# Patient Record
Sex: MALE | Race: BLACK OR AFRICAN AMERICAN | Employment: OTHER | URBAN - METROPOLITAN AREA
[De-identification: names, ages, dates, MRNs, and addresses within clinical notes are randomized per-mention and may not be internally consistent; named-entity substitution may affect disease eponyms.]

---

## 2019-03-17 ENCOUNTER — HOSPITAL ENCOUNTER (EMERGENCY)
Facility: HOSPITAL | Age: 34
Discharge: HOME | End: 2019-03-17
Attending: EMERGENCY MEDICINE
Payer: MEDICARE

## 2019-03-17 VITALS
HEIGHT: 73 IN | RESPIRATION RATE: 19 BRPM | BODY MASS INDEX: 28.36 KG/M2 | SYSTOLIC BLOOD PRESSURE: 135 MMHG | HEART RATE: 93 BPM | WEIGHT: 214 LBS | DIASTOLIC BLOOD PRESSURE: 88 MMHG | OXYGEN SATURATION: 96 % | TEMPERATURE: 98 F

## 2019-03-17 DIAGNOSIS — R56.9 SEIZURE (HCC): Primary | ICD-10-CM

## 2019-03-17 LAB
ANION GAP SERPL CALC-SCNC: 11 MMOL/L (ref 0–18)
BASOPHILS # BLD AUTO: 0.02 X10(3) UL (ref 0–0.2)
BASOPHILS NFR BLD AUTO: 0.3 %
BUN BLD-MCNC: 13 MG/DL (ref 7–18)
BUN/CREAT SERPL: 9.8 (ref 10–20)
CALCIUM BLD-MCNC: 8.7 MG/DL (ref 8.5–10.1)
CHLORIDE SERPL-SCNC: 106 MMOL/L (ref 98–107)
CO2 SERPL-SCNC: 23 MMOL/L (ref 21–32)
CREAT BLD-MCNC: 1.33 MG/DL (ref 0.7–1.3)
DEPRECATED RDW RBC AUTO: 44 FL (ref 35.1–46.3)
EOSINOPHIL # BLD AUTO: 0.06 X10(3) UL (ref 0–0.7)
EOSINOPHIL NFR BLD AUTO: 0.9 %
ERYTHROCYTE [DISTWIDTH] IN BLOOD BY AUTOMATED COUNT: 13.3 % (ref 11–15)
GLUCOSE BLD-MCNC: 108 MG/DL (ref 70–99)
GLUCOSE BLDC GLUCOMTR-MCNC: 108 MG/DL (ref 70–99)
HCT VFR BLD AUTO: 49.1 % (ref 39–53)
HGB BLD-MCNC: 16.3 G/DL (ref 13–17.5)
IMM GRANULOCYTES # BLD AUTO: 0.05 X10(3) UL (ref 0–1)
IMM GRANULOCYTES NFR BLD: 0.8 %
LYMPHOCYTES # BLD AUTO: 0.99 X10(3) UL (ref 1–4)
LYMPHOCYTES NFR BLD AUTO: 15.1 %
MCH RBC QN AUTO: 29.8 PG (ref 26–34)
MCHC RBC AUTO-ENTMCNC: 33.2 G/DL (ref 31–37)
MCV RBC AUTO: 89.8 FL (ref 80–100)
MONOCYTES # BLD AUTO: 1.12 X10(3) UL (ref 0.1–1)
MONOCYTES NFR BLD AUTO: 17.1 %
NEUTROPHILS # BLD AUTO: 4.3 X10 (3) UL (ref 1.5–7.7)
NEUTROPHILS # BLD AUTO: 4.3 X10(3) UL (ref 1.5–7.7)
NEUTROPHILS NFR BLD AUTO: 65.8 %
OSMOLALITY SERPL CALC.SUM OF ELEC: 291 MOSM/KG (ref 275–295)
PLATELET # BLD AUTO: 230 10(3)UL (ref 150–450)
POTASSIUM SERPL-SCNC: 3.7 MMOL/L (ref 3.5–5.1)
RBC # BLD AUTO: 5.47 X10(6)UL (ref 4.3–5.7)
SODIUM SERPL-SCNC: 140 MMOL/L (ref 136–145)
WBC # BLD AUTO: 6.5 X10(3) UL (ref 4–11)

## 2019-03-17 PROCEDURE — 85025 COMPLETE CBC W/AUTO DIFF WBC: CPT

## 2019-03-17 PROCEDURE — 36415 COLL VENOUS BLD VENIPUNCTURE: CPT

## 2019-03-17 PROCEDURE — 82962 GLUCOSE BLOOD TEST: CPT

## 2019-03-17 PROCEDURE — 80177 DRUG SCRN QUAN LEVETIRACETAM: CPT

## 2019-03-17 PROCEDURE — 99284 EMERGENCY DEPT VISIT MOD MDM: CPT

## 2019-03-17 PROCEDURE — 93010 ELECTROCARDIOGRAM REPORT: CPT | Performed by: EMERGENCY MEDICINE

## 2019-03-17 PROCEDURE — 80177 DRUG SCRN QUAN LEVETIRACETAM: CPT | Performed by: EMERGENCY MEDICINE

## 2019-03-17 PROCEDURE — 85025 COMPLETE CBC W/AUTO DIFF WBC: CPT | Performed by: EMERGENCY MEDICINE

## 2019-03-17 PROCEDURE — 80048 BASIC METABOLIC PNL TOTAL CA: CPT

## 2019-03-17 PROCEDURE — 93005 ELECTROCARDIOGRAM TRACING: CPT

## 2019-03-17 PROCEDURE — 80048 BASIC METABOLIC PNL TOTAL CA: CPT | Performed by: EMERGENCY MEDICINE

## 2019-03-17 RX ORDER — TRAMADOL HYDROCHLORIDE 50 MG/1
50 TABLET ORAL EVERY 6 HOURS PRN
COMMUNITY

## 2019-03-17 RX ORDER — LEVETIRACETAM 500 MG/1
1000 TABLET ORAL ONCE
Status: COMPLETED | OUTPATIENT
Start: 2019-03-17 | End: 2019-03-17

## 2019-03-17 NOTE — ED NOTES
Patient given discharge instructions and all questions answered. Patient is dressed and ambulatory with steady gait to exit. Pt mother here to drive pt home.

## 2019-03-17 NOTE — ED INITIAL ASSESSMENT (HPI)
Patient had a witnessed seizure while lying on the couch. EMS states patient had snoring respirations upon arrival but woke with sternal rub. Patient is alert and oriented at this time with no c/o. Patient last seizure 1 year ago.  Takes keppra, last dose F

## 2019-03-18 NOTE — ED PROVIDER NOTES
Patient Seen in: [facility] AND [facility] Emergency Department    History   Patient presents with:  Seizure Disorder (neurologic)    Stated Complaint: seizure    HPI    35 yo male with h/o seizure disorder on keppra presents after a seizure.  He fell asleep on t guarding. Musculoskeletal: Normal range of motion. He exhibits no edema or tenderness. Lymphadenopathy:     He has no cervical adenopathy. Neurological: He is alert and oriented to person, place, and time. No cranial nerve deficit or sensory deficit. Impression:  Seizure [facility])  (primary encounter diagnosis)    Disposition:  Discharge  3/17/2019  5:45 am    Follow-up:  Mikey Fuentes MD  [address] [phone]    In 1 week      We recommend that you s

## 2019-03-18 NOTE — CM/SW NOTE
Printed resources on how to report seizures to [facility] in [place], including form for patient and MD to fill out as requested from [address] [place] [address]. Resources given to patient with his discharge instructions per [facility].

## 2019-03-19 LAB — LEVETIRACETAM (KEPPRA): <2 UG/ML

## 2025-01-18 ENCOUNTER — HOSPITAL ENCOUNTER (INPATIENT)
Facility: HOSPITAL | Age: 40
LOS: 1 days | Discharge: ANOTHER HOSPITAL | DRG: 299 | End: 2025-01-19
Attending: EMERGENCY MEDICINE | Admitting: INTERNAL MEDICINE
Payer: MEDICAID

## 2025-01-18 ENCOUNTER — HOSPITAL ENCOUNTER (INPATIENT)
Facility: HOSPITAL | Age: 40
LOS: 1 days | Discharge: STILL ADMITTED OR ADMINISTRATIVE | End: 2025-01-19
Attending: EMERGENCY MEDICINE | Admitting: INTERNAL MEDICINE
Payer: MEDICAID

## 2025-01-18 ENCOUNTER — ANESTHESIA EVENT (OUTPATIENT)
Dept: INTERVENTIONAL RADIOLOGY/VASCULAR | Facility: HOSPITAL | Age: 40
End: 2025-01-18
Payer: MEDICAID

## 2025-01-18 ENCOUNTER — APPOINTMENT (OUTPATIENT)
Dept: ULTRASOUND IMAGING | Facility: HOSPITAL | Age: 40
DRG: 299 | End: 2025-01-18
Attending: EMERGENCY MEDICINE
Payer: MEDICAID

## 2025-01-18 ENCOUNTER — APPOINTMENT (OUTPATIENT)
Dept: CT IMAGING | Facility: HOSPITAL | Age: 40
DRG: 299 | End: 2025-01-18
Attending: EMERGENCY MEDICINE
Payer: MEDICAID

## 2025-01-18 ENCOUNTER — APPOINTMENT (OUTPATIENT)
Dept: INTERVENTIONAL RADIOLOGY/VASCULAR | Facility: HOSPITAL | Age: 40
DRG: 299 | End: 2025-01-18
Attending: SURGERY
Payer: MEDICAID

## 2025-01-18 ENCOUNTER — APPOINTMENT (OUTPATIENT)
Dept: ULTRASOUND IMAGING | Facility: HOSPITAL | Age: 40
End: 2025-01-18
Attending: EMERGENCY MEDICINE
Payer: MEDICAID

## 2025-01-18 ENCOUNTER — APPOINTMENT (OUTPATIENT)
Dept: GENERAL RADIOLOGY | Facility: HOSPITAL | Age: 40
DRG: 299 | End: 2025-01-18
Attending: EMERGENCY MEDICINE
Payer: MEDICAID

## 2025-01-18 ENCOUNTER — APPOINTMENT (OUTPATIENT)
Dept: CT IMAGING | Facility: HOSPITAL | Age: 40
End: 2025-01-18
Attending: EMERGENCY MEDICINE
Payer: MEDICAID

## 2025-01-18 ENCOUNTER — APPOINTMENT (OUTPATIENT)
Dept: INTERVENTIONAL RADIOLOGY/VASCULAR | Facility: HOSPITAL | Age: 40
End: 2025-01-18
Attending: SURGERY
Payer: MEDICAID

## 2025-01-18 ENCOUNTER — APPOINTMENT (OUTPATIENT)
Dept: GENERAL RADIOLOGY | Facility: HOSPITAL | Age: 40
End: 2025-01-18
Attending: EMERGENCY MEDICINE
Payer: MEDICAID

## 2025-01-18 ENCOUNTER — HOSPITAL ENCOUNTER (INPATIENT)
Facility: HOSPITAL | Age: 40
LOS: 1 days | Discharge: ANOTHER HOSPITAL | End: 2025-01-19
Attending: EMERGENCY MEDICINE | Admitting: INTERNAL MEDICINE
Payer: MEDICAID

## 2025-01-18 DIAGNOSIS — I71.00 DISSECTION OF AORTA, UNSPECIFIED PORTION OF AORTA (HCC): Primary | ICD-10-CM

## 2025-01-18 DIAGNOSIS — I10 HYPERTENSION, UNSPECIFIED TYPE: ICD-10-CM

## 2025-01-18 LAB
ALBUMIN SERPL-MCNC: 3.9 G/DL (ref 3.2–4.8)
ALBUMIN/GLOB SERPL: 1.3 {RATIO} (ref 1–2)
ALP LIVER SERPL-CCNC: 73 U/L
ALT SERPL-CCNC: 7 U/L
ANION GAP SERPL CALC-SCNC: 11 MMOL/L (ref 0–18)
ANTIBODY SCREEN: POSITIVE
APTT PPP: 30 SECONDS (ref 23–36)
AST SERPL-CCNC: 10 U/L (ref ?–34)
BASOPHILS # BLD AUTO: 0.02 X10(3) UL (ref 0–0.2)
BASOPHILS NFR BLD AUTO: 0.2 %
BILIRUB SERPL-MCNC: 0.3 MG/DL (ref 0.3–1.2)
BUN BLD-MCNC: 48 MG/DL (ref 9–23)
BUN/CREAT SERPL: 4.6 (ref 10–20)
CALCIUM BLD-MCNC: 8.6 MG/DL (ref 8.7–10.4)
CHLORIDE SERPL-SCNC: 104 MMOL/L (ref 98–112)
CK SERPL-CCNC: 142 U/L
CO2 SERPL-SCNC: 29 MMOL/L (ref 21–32)
CREAT BLD-MCNC: 10.37 MG/DL
DEPRECATED RDW RBC AUTO: 51.9 FL (ref 35.1–46.3)
DIRECT COOMBS POLY: NEGATIVE
EGFRCR SERPLBLD CKD-EPI 2021: 6 ML/MIN/1.73M2 (ref 60–?)
EOSINOPHIL # BLD AUTO: 0.15 X10(3) UL (ref 0–0.7)
EOSINOPHIL NFR BLD AUTO: 1.6 %
ERYTHROCYTE [DISTWIDTH] IN BLOOD BY AUTOMATED COUNT: 15.8 % (ref 11–15)
GLOBULIN PLAS-MCNC: 3.1 G/DL (ref 2–3.5)
GLUCOSE BLD-MCNC: 102 MG/DL (ref 70–99)
GLUCOSE BLDC GLUCOMTR-MCNC: 102 MG/DL (ref 70–99)
HCT VFR BLD AUTO: 27.4 %
HGB BLD-MCNC: 9 G/DL
IMM GRANULOCYTES # BLD AUTO: 0.04 X10(3) UL (ref 0–1)
IMM GRANULOCYTES NFR BLD: 0.4 %
LYMPHOCYTES # BLD AUTO: 0.75 X10(3) UL (ref 1–4)
LYMPHOCYTES NFR BLD AUTO: 7.9 %
MCH RBC QN AUTO: 30 PG (ref 26–34)
MCHC RBC AUTO-ENTMCNC: 32.8 G/DL (ref 31–37)
MCV RBC AUTO: 91.3 FL
MONOCYTES # BLD AUTO: 1.82 X10(3) UL (ref 0.1–1)
MONOCYTES NFR BLD AUTO: 19.1 %
NEUTROPHILS # BLD AUTO: 6.74 X10 (3) UL (ref 1.5–7.7)
NEUTROPHILS # BLD AUTO: 6.74 X10(3) UL (ref 1.5–7.7)
NEUTROPHILS NFR BLD AUTO: 70.8 %
OSMOLALITY SERPL CALC.SUM OF ELEC: 311 MOSM/KG (ref 275–295)
PLATELET # BLD AUTO: 196 10(3)UL (ref 150–450)
POTASSIUM SERPL-SCNC: 5.1 MMOL/L (ref 3.5–5.1)
PROT SERPL-MCNC: 7 G/DL (ref 5.7–8.2)
RBC # BLD AUTO: 3 X10(6)UL
RH BLOOD TYPE: POSITIVE
RH BLOOD TYPE: POSITIVE
SARS-COV-2 RNA RESP QL NAA+PROBE: NOT DETECTED
SODIUM SERPL-SCNC: 144 MMOL/L (ref 136–145)
TROPONIN I SERPL HS-MCNC: 50 NG/L
WBC # BLD AUTO: 9.5 X10(3) UL (ref 4–11)

## 2025-01-18 PROCEDURE — 93005 ELECTROCARDIOGRAM TRACING: CPT

## 2025-01-18 PROCEDURE — 86900 BLOOD TYPING SEROLOGIC ABO: CPT | Performed by: EMERGENCY MEDICINE

## 2025-01-18 PROCEDURE — 36200 PLACE CATHETER IN AORTA: CPT | Performed by: SURGERY

## 2025-01-18 PROCEDURE — 99291 CRITICAL CARE FIRST HOUR: CPT

## 2025-01-18 PROCEDURE — 37221 HC TRANSLUMINAL ANGIOPLASTY W STENT ILIAC UNILAT INIT: CPT | Performed by: SURGERY

## 2025-01-18 PROCEDURE — 86870 RBC ANTIBODY IDENTIFICATION: CPT | Performed by: EMERGENCY MEDICINE

## 2025-01-18 PROCEDURE — 75635 CT ANGIO ABDOMINAL ARTERIES: CPT | Performed by: EMERGENCY MEDICINE

## 2025-01-18 PROCEDURE — 96368 THER/DIAG CONCURRENT INF: CPT

## 2025-01-18 PROCEDURE — 75957: CPT | Performed by: SURGERY

## 2025-01-18 PROCEDURE — 96365 THER/PROPH/DIAG IV INF INIT: CPT

## 2025-01-18 PROCEDURE — 33881 EVASC RPR TA NDGFT XCOV LSA: CPT | Performed by: SURGERY

## 2025-01-18 PROCEDURE — 93010 ELECTROCARDIOGRAM REPORT: CPT

## 2025-01-18 PROCEDURE — 37253 INTRVASC US NONCORONARY ADDL: CPT | Performed by: SURGERY

## 2025-01-18 PROCEDURE — 36140 INTRO NDL ICATH UPR/LXTR ART: CPT | Performed by: SURGERY

## 2025-01-18 PROCEDURE — 86850 RBC ANTIBODY SCREEN: CPT | Performed by: EMERGENCY MEDICINE

## 2025-01-18 PROCEDURE — 02VW3EZ RESTRICTION OF THORACIC AORTA, DESCENDING WITH BRANCHED OR FENESTRATED INTRALUMINAL DEVICE, ONE OR TWO ARTERIES, PERCUTANEOUS APPROACH: ICD-10-PCS | Performed by: SURGERY

## 2025-01-18 PROCEDURE — 82550 ASSAY OF CK (CPK): CPT | Performed by: EMERGENCY MEDICINE

## 2025-01-18 PROCEDURE — 80053 COMPREHEN METABOLIC PANEL: CPT | Performed by: EMERGENCY MEDICINE

## 2025-01-18 PROCEDURE — 86922 COMPATIBILITY TEST ANTIGLOB: CPT

## 2025-01-18 PROCEDURE — 37223 HC TRANSLUMINAL ANGIOPLASTY W STENT ILIAC EA ADDL: CPT | Performed by: SURGERY

## 2025-01-18 PROCEDURE — 84484 ASSAY OF TROPONIN QUANT: CPT | Performed by: EMERGENCY MEDICINE

## 2025-01-18 PROCEDURE — 37236 OPEN/PERQ PLACE STENT 1ST: CPT | Performed by: SURGERY

## 2025-01-18 PROCEDURE — 71275 CT ANGIOGRAPHY CHEST: CPT | Performed by: EMERGENCY MEDICINE

## 2025-01-18 PROCEDURE — 85025 COMPLETE CBC W/AUTO DIFF WBC: CPT | Performed by: EMERGENCY MEDICINE

## 2025-01-18 PROCEDURE — 86077 PHYS BLOOD BANK SERV XMATCH: CPT | Performed by: EMERGENCY MEDICINE

## 2025-01-18 PROCEDURE — 04VJ3DZ RESTRICTION OF LEFT EXTERNAL ILIAC ARTERY WITH INTRALUMINAL DEVICE, PERCUTANEOUS APPROACH: ICD-10-PCS | Performed by: SURGERY

## 2025-01-18 PROCEDURE — 85730 THROMBOPLASTIN TIME PARTIAL: CPT | Performed by: EMERGENCY MEDICINE

## 2025-01-18 PROCEDURE — 86901 BLOOD TYPING SEROLOGIC RH(D): CPT | Performed by: EMERGENCY MEDICINE

## 2025-01-18 PROCEDURE — 85347 COAGULATION TIME ACTIVATED: CPT

## 2025-01-18 PROCEDURE — 86880 COOMBS TEST DIRECT: CPT | Performed by: EMERGENCY MEDICINE

## 2025-01-18 PROCEDURE — 34709 PLMT XTN PROSTH EVASC RPR: CPT | Performed by: SURGERY

## 2025-01-18 PROCEDURE — 34713 PERQ ACCESS & CLSR FEM ART: CPT | Performed by: SURGERY

## 2025-01-18 PROCEDURE — 82962 GLUCOSE BLOOD TEST: CPT

## 2025-01-18 PROCEDURE — 96375 TX/PRO/DX INJ NEW DRUG ADDON: CPT

## 2025-01-18 PROCEDURE — 37252 INTRVASC US NONCORONARY 1ST: CPT | Performed by: SURGERY

## 2025-01-18 PROCEDURE — 04VH3DZ RESTRICTION OF RIGHT EXTERNAL ILIAC ARTERY WITH INTRALUMINAL DEVICE, PERCUTANEOUS APPROACH: ICD-10-PCS | Performed by: SURGERY

## 2025-01-18 PROCEDURE — 34707 EVASC RPR ILIO-ILIAC NDGFT: CPT | Performed by: SURGERY

## 2025-01-18 RX ORDER — HEPARIN SODIUM AND DEXTROSE 10000; 5 [USP'U]/100ML; G/100ML
1000 INJECTION INTRAVENOUS ONCE
Status: COMPLETED | OUTPATIENT
Start: 2025-01-18 | End: 2025-01-19

## 2025-01-18 RX ORDER — PROTAMINE SULFATE 10 MG/ML
INJECTION, SOLUTION INTRAVENOUS AS NEEDED
Status: DISCONTINUED | OUTPATIENT
Start: 2025-01-18 | End: 2025-01-19 | Stop reason: SURG

## 2025-01-18 RX ORDER — HEPARIN SODIUM 1000 [USP'U]/ML
INJECTION, SOLUTION INTRAVENOUS; SUBCUTANEOUS
Status: COMPLETED
Start: 2025-01-18 | End: 2025-01-18

## 2025-01-18 RX ORDER — IOPAMIDOL 612 MG/ML
400 INJECTION, SOLUTION INTRAVASCULAR
Status: COMPLETED | OUTPATIENT
Start: 2025-01-18 | End: 2025-01-18

## 2025-01-18 RX ORDER — LIDOCAINE HYDROCHLORIDE 20 MG/ML
INJECTION, SOLUTION INFILTRATION; PERINEURAL
Status: COMPLETED
Start: 2025-01-18 | End: 2025-01-18

## 2025-01-18 RX ORDER — ONDANSETRON 2 MG/ML
4 INJECTION INTRAMUSCULAR; INTRAVENOUS ONCE
Status: COMPLETED | OUTPATIENT
Start: 2025-01-18 | End: 2025-01-18

## 2025-01-18 RX ORDER — LIDOCAINE HYDROCHLORIDE 10 MG/ML
INJECTION, SOLUTION INFILTRATION; PERINEURAL
Status: COMPLETED | OUTPATIENT
Start: 2025-01-18 | End: 2025-01-18

## 2025-01-18 RX ORDER — SODIUM CHLORIDE, SODIUM LACTATE, POTASSIUM CHLORIDE, CALCIUM CHLORIDE 600; 310; 30; 20 MG/100ML; MG/100ML; MG/100ML; MG/100ML
INJECTION, SOLUTION INTRAVENOUS CONTINUOUS PRN
Status: DISCONTINUED | OUTPATIENT
Start: 2025-01-18 | End: 2025-01-19 | Stop reason: SURG

## 2025-01-18 RX ORDER — FUROSEMIDE 10 MG/ML
80 INJECTION INTRAMUSCULAR; INTRAVENOUS ONCE
Status: COMPLETED | OUTPATIENT
Start: 2025-01-18 | End: 2025-01-18

## 2025-01-18 RX ORDER — ROCURONIUM BROMIDE 10 MG/ML
INJECTION, SOLUTION INTRAVENOUS AS NEEDED
Status: DISCONTINUED | OUTPATIENT
Start: 2025-01-18 | End: 2025-01-19 | Stop reason: SURG

## 2025-01-18 RX ORDER — ONDANSETRON 2 MG/ML
INJECTION INTRAMUSCULAR; INTRAVENOUS AS NEEDED
Status: DISCONTINUED | OUTPATIENT
Start: 2025-01-18 | End: 2025-01-19 | Stop reason: SURG

## 2025-01-18 RX ORDER — MORPHINE SULFATE 4 MG/ML
4 INJECTION, SOLUTION INTRAMUSCULAR; INTRAVENOUS ONCE
Status: COMPLETED | OUTPATIENT
Start: 2025-01-18 | End: 2025-01-18

## 2025-01-18 RX ORDER — LIDOCAINE HYDROCHLORIDE 10 MG/ML
INJECTION, SOLUTION EPIDURAL; INFILTRATION; INTRACAUDAL; PERINEURAL AS NEEDED
Status: DISCONTINUED | OUTPATIENT
Start: 2025-01-18 | End: 2025-01-19 | Stop reason: SURG

## 2025-01-18 RX ORDER — HEPARIN SODIUM 1000 [USP'U]/ML
INJECTION, SOLUTION INTRAVENOUS; SUBCUTANEOUS AS NEEDED
Status: DISCONTINUED | OUTPATIENT
Start: 2025-01-18 | End: 2025-01-19 | Stop reason: SURG

## 2025-01-18 RX ORDER — PROTAMINE SULFATE 10 MG/ML
INJECTION, SOLUTION INTRAVENOUS
Status: COMPLETED
Start: 2025-01-18 | End: 2025-01-18

## 2025-01-18 RX ORDER — HYDRALAZINE HYDROCHLORIDE 20 MG/ML
15 INJECTION INTRAMUSCULAR; INTRAVENOUS ONCE
Status: COMPLETED | OUTPATIENT
Start: 2025-01-18 | End: 2025-01-18

## 2025-01-18 RX ORDER — HEPARIN SODIUM AND DEXTROSE 10000; 5 [USP'U]/100ML; G/100ML
INJECTION INTRAVENOUS CONTINUOUS
Status: DISCONTINUED | OUTPATIENT
Start: 2025-01-19 | End: 2025-01-19

## 2025-01-18 RX ADMIN — HEPARIN SODIUM 4000 UNITS: 1000 INJECTION, SOLUTION INTRAVENOUS; SUBCUTANEOUS at 22:15:00

## 2025-01-18 RX ADMIN — HEPARIN SODIUM 1000 UNITS: 1000 INJECTION, SOLUTION INTRAVENOUS; SUBCUTANEOUS at 22:52:00

## 2025-01-18 RX ADMIN — ROCURONIUM BROMIDE 20 MG: 10 INJECTION, SOLUTION INTRAVENOUS at 22:11:00

## 2025-01-18 RX ADMIN — PROTAMINE SULFATE 20 MG: 10 INJECTION, SOLUTION INTRAVENOUS at 23:59:00

## 2025-01-18 RX ADMIN — HEPARIN SODIUM 1000 UNITS: 1000 INJECTION, SOLUTION INTRAVENOUS; SUBCUTANEOUS at 22:40:00

## 2025-01-18 RX ADMIN — HEPARIN SODIUM 4000 UNITS: 1000 INJECTION, SOLUTION INTRAVENOUS; SUBCUTANEOUS at 22:05:00

## 2025-01-18 RX ADMIN — HEPARIN SODIUM 1000 UNITS: 1000 INJECTION, SOLUTION INTRAVENOUS; SUBCUTANEOUS at 22:27:00

## 2025-01-18 RX ADMIN — ROCURONIUM BROMIDE 100 MG: 10 INJECTION, SOLUTION INTRAVENOUS at 21:15:00

## 2025-01-18 RX ADMIN — SODIUM CHLORIDE, SODIUM LACTATE, POTASSIUM CHLORIDE, CALCIUM CHLORIDE: 600; 310; 30; 20 INJECTION, SOLUTION INTRAVENOUS at 21:25:00

## 2025-01-18 RX ADMIN — ROCURONIUM BROMIDE 10 MG: 10 INJECTION, SOLUTION INTRAVENOUS at 23:39:00

## 2025-01-18 RX ADMIN — ONDANSETRON 4 MG: 2 INJECTION INTRAMUSCULAR; INTRAVENOUS at 21:58:00

## 2025-01-18 RX ADMIN — LIDOCAINE HYDROCHLORIDE 2 ML: 10 INJECTION, SOLUTION INFILTRATION; PERINEURAL at 21:18:00

## 2025-01-18 RX ADMIN — HEPARIN SODIUM AND DEXTROSE 1000 UNITS/HR: 10000; 5 INJECTION INTRAVENOUS at 21:09:00

## 2025-01-18 RX ADMIN — LIDOCAINE HYDROCHLORIDE 50 MG: 10 INJECTION, SOLUTION EPIDURAL; INFILTRATION; INTRACAUDAL; PERINEURAL at 21:15:00

## 2025-01-18 RX ADMIN — PROTAMINE SULFATE 10 MG: 10 INJECTION, SOLUTION INTRAVENOUS at 23:57:00

## 2025-01-18 RX ADMIN — ROCURONIUM BROMIDE 10 MG: 10 INJECTION, SOLUTION INTRAVENOUS at 23:11:00

## 2025-01-18 RX ADMIN — ROCURONIUM BROMIDE 30 MG: 10 INJECTION, SOLUTION INTRAVENOUS at 22:28:00

## 2025-01-18 RX ADMIN — SODIUM CHLORIDE, SODIUM LACTATE, POTASSIUM CHLORIDE, CALCIUM CHLORIDE: 600; 310; 30; 20 INJECTION, SOLUTION INTRAVENOUS at 21:15:00

## 2025-01-18 NOTE — ED PROVIDER NOTES
Patient Seen in: [facility]         EMERGENCY DEPARTMENT NOTE    Dictated. Voice Transcription software has been utilized for this dictation (the reader should be aware that typographical errors are possible with voice transcription software and to please contact the dictating physician if there are questions.)         History     Chief Complaint   Patient presents with    Leg Pain       There may be discrepancies from triage note.     HPI    History provided by patient who provides an extremely poor history.  39-year-old male who reports a history of end-stage renal disease who receives dialysis Monday, Wednesday, Friday and last received dialysis yesterday complaining of left leg pain for 3 days.  Reports mild perispinal low back pain.  No trauma.  No associated abdominal pain.  Denies skin color changes.  No associated swelling.  States that he has had pain in the past however has never been evaluated.  He cannot quantify how long he has been having this pain.  Patient intermittently verbally aggressive with myself and staff  \" I have already told you my medicines.\"      No fevers, chills, nausea, vomiting, diarrhea, constipation, cough, cold symptoms, urinary complaints.  No chest pain, shortness of breath  No headache, neck pain, neck stiffness, incontinence.  No changes in mentation, no changes in vision, no total/new extremity weakness, no total/new extremity paresthesia, no difficulty speaking.  No alleviating or exacerbating factors.  Denies orthopnea, pnd, change in exercise tolerance limited by chest pain/sob , lower extremity edema/asymmetry.   No personal history of ACS, PE, DVT.  Denies anticoagulation/antiplatelet use          History reviewed.   Past Medical History:    Essential hypertension    Seizure disorder (HCC)    Stroke (HCC)       History reviewed.   Past Surgical History:   Procedure Laterality Date    Transplantation of kidney  2017         Medications :  Prescriptions Prior to  Admission[1]     No family history on file.    Smoking Status:   Social History     Socioeconomic History    Marital status:    Tobacco Use    Smoking status: Never    Smokeless tobacco: Never       Review of Systems   Constitutional: Negative.    HENT: Negative.     Eyes: Negative.    Respiratory: Negative.     Cardiovascular: Negative.    Gastrointestinal: Negative.  Negative for abdominal pain.   Genitourinary: Negative.    Musculoskeletal: Negative.    Skin: Negative.    Neurological: Negative.    Endo/Heme/Allergies: Negative.    Psychiatric/Behavioral: Negative.     All other systems reviewed and are negative.    Pertinent positives as listed.  All other organ systems are reviewed and are negative.    Constitutional and vital signs reviewed.      Social History and Family History elements reviewed from today, pertinent positives to the presenting problem noted.    Physical Exam     ED Triage Vitals [01/18/25 1658]   BP (!) 196/113   Pulse 83   Resp 20   Temp 99 °F (37.2 °C)   Temp src Temporal   SpO2 96 %   O2 Device None (Room air)       All measures to prevent infection transmission during my interaction with the patient were taken. The patient was already wearing a droplet mask on my arrival to the room. Personal protective equipment including droplet mask, eye protection, and gloves were worn throughout the duration of the exam.  Handwashing was performed prior to and after the exam.  Stethoscope and any equipment used during my examination was cleaned with super sani-cloth germicidal wipes following the exam.     Physical Exam  Vitals and nursing note reviewed.   Constitutional:       General: He is not in acute distress.     Appearance: He is not ill-appearing or toxic-appearing.   Cardiovascular:      Rate and Rhythm: Normal rate and regular rhythm.      Comments: Dorsalis pedis pulses are not palpable B however pt has B warm feet. B dp pulse dopplerable, but thready.   Pulmonary:      Effort:  Pulmonary effort is normal. No respiratory distress.      Breath sounds: No stridor. No wheezing, rhonchi or rales.   Chest:      Chest wall: No tenderness.   Abdominal:      General: There is no distension.      Palpations: Abdomen is soft. There is no mass.      Tenderness: There is no abdominal tenderness. There is no right CVA tenderness, left CVA tenderness, guarding or rebound.      Comments: Negative Longoria sign, negative McBurney's point tenderness     Musculoskeletal:      Cervical back: Normal range of motion and neck supple.      Right lower leg: No edema.      Left lower leg: No edema.      Comments: No spinal tenderness diffusely.  No step-offs.  Normal range of motion of back.  No hpi td      Skin:     Capillary Refill: Capillary refill takes less than 2 seconds.      Coloration: Skin is not jaundiced or pale.      Findings: No bruising, erythema or rash.   Neurological:      Mental Status: He is alert.      Comments: 5/5 bilateral leg extension/flexion  5/5 bilateral knee extension  5/5 B foot dorsiflexion/plantarflexion    Sensory function intact symmetrically and bilaterally to lower extremities.       Psychiatric:         Mood and Affect: Mood normal.         Behavior: Behavior normal.           Review of prior notes in Care everywhere/Epic performed by myself:  Patient was seen in the ER 1/6/2025 for uncontrolled hypertension upon chart review  It appears patient saw neurology November 2024.  Seizures after CVA noted per this note.  Stroke in 2019 per this note.  Per this note it appeared patient had a left MCA stroke in 2019 with residual aphasia.  Failed renal transplant in 2017.          ED Course     If labs obtained, they are personally reviewed by myself:     Labs Reviewed   COMP METABOLIC PANEL (14) - Abnormal; Notable for the following components:       Result Value    Glucose 102 (*)     BUN 48 (*)     Creatinine 10.37 (*)     BUN/CREA Ratio 4.6 (*)     Calcium, Total 8.6 (*)      Calculated Osmolality 311 (*)     eGFR-Cr 6 (*)     ALT 7 (*)     All other components within normal limits   CBC WITH DIFFERENTIAL WITH PLATELET - Abnormal; Notable for the following components:    RBC 3.00 (*)     HGB 9.0 (*)     HCT 27.4 (*)     RDW-SD 51.9 (*)     RDW 15.8 (*)     Lymphocyte Absolute 0.75 (*)     Monocyte Absolute 1.82 (*)     All other components within normal limits   POCT GLUCOSE - Abnormal; Notable for the following components:    POC Glucose  102 (*)     All other components within normal limits   CK CREATINE KINASE (NOT CREATININE) - Normal   PTT, ACTIVATED - Normal   TROPONIN I HIGH SENSITIVITY - Normal   RAPID SARS-COV-2 BY PCR - Normal   TYPE AND SCREEN    Narrative:     The following orders were created for panel order Type and screen.                  Procedure                               Abnormality         Status                                     ---------                               -----------         ------                                     ABORH (Blood Type)[[id number]]                               Final result                               Antibody Screen[[id number]]                                  Final result                                                 Please view results for these tests on the individual orders.   ABORH (BLOOD TYPE)   ANTIBODY SCREEN   ABORH CONFIRMATION   PREPARE RBC   DIRECT ANTIGLOBULIN TEST (EDGARDO)   PREPARE RBC   RAINBOW DRAW LAVENDER   RAINBOW DRAW LIGHT GREEN   RAINBOW DRAW BLUE   RAINBOW DRAW GOLD       If radiologic studies ordered during today's ER visit, my independent interpretation are seen directly below.  This is awaiting the radiologist's final interpretation.  CTA chest, independent interpretation of radiologic study completed by myself and awaiting formal radiologist interpretation: + dissection  CTA abd/pelvis: + dissection           Imaging Results read by radiology in ED: CTA CHEST (CPT=71275)    Result Date:  1/18/2025  CONCLUSION:  Markedly suboptimal artifactually degraded examination. Within these parameters: 1. Descending aortic dissection (likely Farmington type B, without clear evidence of Sarbjit type A involvement of the ascending thoracic aorta).  2. Aortic thrombus is suggested in the false lumen in the descending thoracic aorta.  3. Suspected severe bilateral pulmonary interstitial edema, with or without concurrent infectious process.  4. Bilateral pleural effusions.  5. No evidence of acute central pulmonary embolism involving the main pulmonary artery branches, but without diagnostic assessment of lobar, segmental, or subsegmental levels.  6. Dilatation of the main pulmonary artery trunk may relate to underlying pulmonary hypertension.   7. Ascending thoracic aortic ectasia.  8. Mediastinal lymphadenopathy, perhaps reactive.  9. Lesser incidental findings as above.    Results of this examination were discussed with the patient's physician, Dr. Tyler, by Dr. Culp at 1938 (as study was in progress on the CT scanner) on 01/18/2025.   Dictated by (CST): Enzo Culp MD on 1/18/2025 at 7:48 PM     Finalized by (CST): Enzo Culp MD on 1/18/2025 at 7:57 PM          CTA ABDOMEN/PELVIS LOWER EXT BILAT W RUNOFF (CZC=[id number])    Result Date: 1/18/2025  CONCLUSION:  1. Aortic dissection throughout the visualized descending thoracic aorta and abdominal aorta; the proximal extent of the dissection is not known (whether this represents a Farmington type A or Farmington type B dissection). Completion CT angiography of the chest is recommended.  2. Suggestion of extensive pulmonary interstitial edema. This could be acute; correlation for cardiac insufficiency is recommended.  3. The aortic dissection propagates into the celiac axis; the SMA appears to arise from the true lumen.   4. Dissection flap appears to extend into the left renal artery ostium, and left renal arterial flow predominately derived from the false  lumen.  5. Dissection flap extending into the right common iliac artery and right internal iliac artery origin; there is occlusive thrombus involving the distal right common femoral artery over 3.9 cm length with distal reconstitution. Distally, there is patency  of the popliteal artery with three-vessel runoff.  6. Extensive dissection extending into the left common iliac artery with occlusive thrombus of the distal common femoral artery involving a 3.5 cm length segment. There is suspected propagation of the dissection into the left superficial femoral and popliteal arteries, with relatively diminished flow in the left lower extremity. There is thready flow in the runoff vessels.  7. Flow is visualized in the left dorsalis pedis, which appears to have variant anatomy driving from the left peroneal artery.  8. There are bilateral pleural effusions and associated atelectasis, with or without superimposed pneumonia.  9. Suspected rejected right lower quadrant renal allograft.   10. Advanced atrophy of the native kidneys.  11. Low-density appearance of the intracardiac blood pool raises the possibility of underlying anemia. Correlate with hematologic parameters.  12. Heavy stool burden with possible fecal impaction in the rectal vault.  13. Lesser incidental findings as above.     Results of this examination were discussed with the patient's physician, Dr. Pelletier, by Dr. Culp at 1911 on 01/18/2025.   Dictated by (CST): Enzo Culp MD on 1/18/2025 at 7:11 PM     Finalized by (CST): Enzo Culp MD on 1/18/2025 at 7:32 PM               ED Medications Administered:   Medications   esmolol (Brevibloc) 2000 mg/100mL infusion premix ( Intravenous Handoff 1/18/25 2104)   heparin (Porcine) 70709 units/250mL infusion ED INITIAL DOSE (1,000 Units/hr Intravenous Handoff 1/18/25 2104)   heparin (Porcine) 22661 units/250mL infusion ACS/AFIB CONTINUOUS (1,000 Units/hr Intravenous New Bag 1/18/25 2109)   clevidipine  (Cleviprex) 25 MG/50ML IV infusion (has no administration in time range)   norepinephrine (Levophed) 4 mg/250mL infusion premix (has no administration in time range)   niCARdipine in sodium chloride 0.86% (carDENE) 20 mg/200mL infusion premix ( Intravenous Handoff 1/18/25 2104)   morphINE PF 4 MG/ML injection 4 mg (4 mg Intravenous Given 1/18/25 1734)   ondansetron (Zofran) 4 MG/2ML injection 4 mg (4 mg Intravenous Given 1/18/25 1741)   hydrALAzine (Apresoline) 20 mg/mL injection 15 mg (15 mg Intravenous Given 1/18/25 1907)   iopamidol 76% (ISOVUE-370) injection for power injector (80 mL Intravenous Given 1/18/25 1855)   fentaNYL (Sublimaze) 50 mcg/mL injection 50 mcg (50 mcg Intravenous Given 1/18/25 1915)   iopamidol 76% (ISOVUE-370) injection for power injector (80 mL Intravenous Given 1/18/25 1940)   furosemide (Lasix) 10 mg/mL injection 80 mg (80 mg Intravenous Given 1/18/25 1948)   nitroGLYCERIN (Nitrobid) 2 % ointment 1 inch (1 inch Topical Given 1/18/25 1947)   heparin (Porcine) 1000 UNIT/ML injection (has no administration in time range)   heparin in sodium chloride 0.9% (Porcine) 2 Units/mL flush bag premix (has no administration in time range)   lidocaine (Xylocaine) 2 % injection (has no administration in time range)   ceFAZolin (Ancef) 2 g/10mL IV syringe premix (has no administration in time range)   fentaNYL (Sublimaze) 50 mcg/mL injection (has no administration in time range)           Vitals:    01/18/25 2045 01/18/25 2050 01/18/25 2056 01/18/25 2101   BP: (!) 158/100 (!) 150/92 140/88 142/88   Pulse:  88 87 87   Resp:  24 20 20   Temp:       TempSrc:       SpO2:  94% 92%    Weight:       Height:         *I personally reviewed and interpreted all ED vitals.    Pulse Ox interpretation by myself: 99%, Room air, Normal     Monitor Interpretation by myself:   normal sinus rhythm    If Ekg obtained during today's visit, it is independently interpreted by myself directly below:  EKG    Rate, intervals and  axes as noted on EKG Report.  Rate: 82  Rhythm: Sinus Rhythm  Reading: no st elevation, no st depression LVH, T wave noted in 1-3 in lateral leads               Medical Record Review: I personally reviewed available prior medical records for any recent pertinent discharge summaries, testing, and procedures and reviewed those reports.      [facility]     Medical decision making/ED Course:   39-year-old male who presents to the emergency room for left lower leg pain.  Extremely difficult history to obtain.  He also reported some mild paraspinal low back pain, no abdominal pain.  Dorsalis pedis pulses could not be palpable however they were dopplerable on exam.  Warm feet bilaterally.  Moving all extremities however he provided limited exam.  His symptoms seem most consistent with aortic dissection.  I personally spoke to reading radiologist twice discussing his CT scans.  CTA abdomen/pelvis with runoffs initially obtained with almost complete descending aortic dissection noted.  Patient sent back to scanner to receive CTA of chest and was found to have type B dissection.  He arrived to the emergency department hypertensive and initially hydralazine was given without improvement of his blood pressure.  Case discussed with vascular surgeon who requested esmolol, heparin, blood on hold.  CT of patient's chest revealed pulmonary edema, likely secondary to his known dissection.  Case rediscussed with vascular surgery who did not believe nitroglycerin would be helpful at this time.  Recommended emergent repair.  He swiftly rounded his team and evaluated patient here in the emergency department.  I personally spoke to  as vascular surgery stated he would prefer patient to be transferred to [facility] after repair here at [facility].  She informs me there is only 1 ICU bed at [facility].  Labs revealing stable electrolytes.  Patient's creatinine is elevated however he is known history of end-stage renal  disease.  No leukocytosis, hemoglobin of 9.  Platelet count normal.  CK level normal.  Troponin negative.  APTT normal.  Upon numerous reevaluations, patient remained hypertensive however blood pressures mildly improved with esmolol and nicardipine.    Patient was taken to the operating room by our staff.  I personally spoke to him and his ex-wife regarding his critical status.      ACS, PE, among other life-threatening medical conditions considered and seems unlikely given patient's history, exam, and appearance.  Pt agrees and is aware of plan.             Differential Diagnosis:  as listed above in medical decision making.   *Please note that in the presenting to the emergency department, illness/injury that poses a threat to life or function is considered during this patient's initial evaluation.    The complexity of this visit is therefore inherently more complex given the need to consider life threatening pathology prior to any other etiology for this patient's visit.    The differential diagnosis and medical decision above exemplify this rationale.       Medical Decision Making  Problems Addressed:  Dissection of aorta, unspecified portion of aorta (HCC): acute illness or injury that poses a threat to life or bodily functions  Hypertension, unspecified type: chronic illness or injury with exacerbation, progression, or side effects of treatment    Amount and/or Complexity of Data Reviewed  External Data Reviewed: notes.  Labs: ordered. Decision-making details documented in ED Course.  Radiology: ordered and independent interpretation performed. Decision-making details documented in ED Course.  ECG/medicine tests: ordered and independent interpretation performed. Decision-making details documented in ED Course.  Discussion of management or test interpretation with external provider(s): CT technician  Vascular surgeon  Cardiothoracic surgeon  Radiologist    Risk  Parenteral controlled substances.  Decision  regarding hospitalization.  Elective major surgery with identified risk factors.    Critical Care  Total time providing critical care: 79 minutes          ED Course as of 01/19/25 0042  ------------------------------------------------------------  Time: 01/18 1700  Comment: Patient's blood pressure is elevated 180/103, pulse of 82,  ------------------------------------------------------------  Time: 01/18 1745  Comment: Patient's blood pressure remains elevated 179/101  ------------------------------------------------------------  Time: 01/18 1800  Comment: Patient blood pressure remains elevated 165/101  ------------------------------------------------------------  Time: 01/18 1908  Comment: Personally spoke to CT technician, concern for possible dissection.  Images not viewable at this time as they are reconstituting.  I requested radiologist to read in an expedited fashion  ------------------------------------------------------------  Time: 01/18 1916  Comment: Dr. Sifuentes reviewing images.  Request esmolol with parameters as mentioned in order, 4 units on hold , he request heparin drip at this time  ------------------------------------------------------------  Time: 01/18 1947  Comment: Case discussed with radiologist informs that there is an aortic arch thrombus and type B dissection.  No ascending aortic dissection.  Case discussed with vascular cardiology thoracic surgeon who informs me type B dissection will be managed by vascular surgery, Dr. Sifuentes.   ------------------------------------------------------------  Time: 01/18 2000  Comment: Respoke to vascular surgeon over telephone.  It appears patient's blood has antibodies, vascular surgeon aware.  He  requests heparin, this was ordered.  He is and route to the hospital for surgery.  I personally spoke to patient, patient's ex-wife via telephone who will notify patient's mother.  Patient and patient's ex-wife are aware of patient's critical condition.     Patient remains in no significant distress, saturating 95% on 8 L of high flow.   ------------------------------------------------------------  Time: 01/18 2011  Comment: Third IV established  ------------------------------------------------------------  Time: 01/18 2021  Comment: Vascular surgeon at bedside.  Patient saturating 91% on 8 L of high flow.  Blood pressures remain high.       Vitals:    01/18/25 2045 01/18/25 2050 01/18/25 2056 01/18/25 2101   BP: (!) 158/100 (!) 150/92 140/88 142/88   Pulse:  88 87 87   Resp:  24 20 20   Temp:       TempSrc:       SpO2:  94% 92%    Weight:       Height:                 Complicating Factors: Significant medical problems that contribute to the complexity of this emergency room evaluation is listed above.    Condition upon leaving the department: Stable    Disposition and Plan     Clinical Impression:  1. Dissection of aorta, unspecified portion of aorta (HCC)    2. Hypertension, unspecified type        Disposition:  Send to or/cath/gi    Medications Prescribed:  Current Discharge Medication List                     [1]   Medications Prior to Admission   Medication Sig Dispense Refill Last Dose/Taking    NON FORMULARY 2 (two) times daily. Nifedipine unknown dose       NON FORMULARY keppra unknown dose       traMADol HCl 50 MG Oral Tab Take 50 mg by mouth every 6 (six) hours as needed for Pain.       NON FORMULARY Anti rejection med for kidney transplant

## 2025-01-18 NOTE — ED INITIAL ASSESSMENT (HPI)
Pt arrivs via EMS c/o left leg pain radiating from thigh down and tingling starting ~ 90 mins PTA. From home. BP w/ EMS 200s/130s . HD MWF. 3L removed yesterday. Santos -    Pt at [facility] 2 weeks ago for seizure. Pt fell at home today d/t leg pain. Denies LOC

## 2025-01-19 ENCOUNTER — HOSPITAL ENCOUNTER (INPATIENT)
Facility: HOSPITAL | Age: 40
LOS: 6 days | Discharge: HOME | End: 2025-01-25
Attending: SURGERY | Admitting: SURGERY
Payer: MEDICAID

## 2025-01-19 ENCOUNTER — APPOINTMENT (OUTPATIENT)
Dept: CT IMAGING | Facility: HOSPITAL | Age: 40
End: 2025-01-19
Attending: SURGERY
Payer: MEDICAID

## 2025-01-19 VITALS
HEIGHT: 72 IN | TEMPERATURE: 97 F | WEIGHT: 201.94 LBS | RESPIRATION RATE: 18 BRPM | DIASTOLIC BLOOD PRESSURE: 85 MMHG | BODY MASS INDEX: 27.35 KG/M2 | SYSTOLIC BLOOD PRESSURE: 152 MMHG | OXYGEN SATURATION: 95 % | HEART RATE: 93 BPM

## 2025-01-19 DIAGNOSIS — I71.21 ANEURYSM OF ASCENDING AORTA WITHOUT RUPTURE: Primary | ICD-10-CM

## 2025-01-19 PROBLEM — I10 PRIMARY HYPERTENSION: Status: ACTIVE | Noted: 2025-01-18

## 2025-01-19 PROBLEM — N18.6 ESRD (END STAGE RENAL DISEASE) (HCC): Status: ACTIVE | Noted: 2025-01-19

## 2025-01-19 PROBLEM — I71.03 DISSECTION OF AORTA, THORACOABDOMINAL (HCC): Status: ACTIVE | Noted: 2025-01-19

## 2025-01-19 PROBLEM — I71.012 DESCENDING THORACIC AORTIC DISSECTION (HCC): Status: ACTIVE | Noted: 2025-01-19

## 2025-01-19 PROBLEM — Z99.2 ANEMIA DUE TO CHRONIC KIDNEY DISEASE, ON CHRONIC DIALYSIS (HCC): Status: ACTIVE | Noted: 2025-01-19

## 2025-01-19 PROBLEM — N18.6 ANEMIA DUE TO CHRONIC KIDNEY DISEASE, ON CHRONIC DIALYSIS (HCC): Status: ACTIVE | Noted: 2025-01-19

## 2025-01-19 PROBLEM — D63.1 ANEMIA DUE TO CHRONIC KIDNEY DISEASE, ON CHRONIC DIALYSIS (HCC): Status: ACTIVE | Noted: 2025-01-19

## 2025-01-19 LAB
ALBUMIN SERPL-MCNC: 3.9 G/DL (ref 3.2–4.8)
ALBUMIN/GLOB SERPL: 1.3 {RATIO} (ref 1–2)
ALP LIVER SERPL-CCNC: 68 U/L
ALT SERPL-CCNC: <7 U/L
ANION GAP SERPL CALC-SCNC: 11 MMOL/L (ref 0–18)
AST SERPL-CCNC: 11 U/L (ref ?–34)
ATRIAL RATE: 82 BPM
BILIRUB SERPL-MCNC: 0.2 MG/DL (ref 0.3–1.2)
BLOOD TYPE BARCODE: 9500
BUN BLD-MCNC: 51 MG/DL (ref 9–23)
CALCIUM BLD-MCNC: 8.2 MG/DL (ref 8.7–10.6)
CHLORIDE SERPL-SCNC: 100 MMOL/L (ref 98–112)
CHOLEST SERPL-MCNC: 164 MG/DL (ref ?–200)
CO2 SERPL-SCNC: 26 MMOL/L (ref 21–32)
CREAT BLD-MCNC: 10.96 MG/DL
EGFRCR SERPLBLD CKD-EPI 2021: 6 ML/MIN/1.73M2 (ref 60–?)
ERYTHROCYTE [DISTWIDTH] IN BLOOD BY AUTOMATED COUNT: 15.9 %
GLOBULIN PLAS-MCNC: 2.9 G/DL (ref 2–3.5)
GLUCOSE BLD-MCNC: 110 MG/DL (ref 70–99)
GLUCOSE BLDC GLUCOMTR-MCNC: 104 MG/DL (ref 70–99)
HBV SURFACE AB SER QL: NONREACTIVE
HBV SURFACE AB SERPL IA-ACNC: <3.1 MIU/ML
HBV SURFACE AG SER-ACNC: 0.38 [IU]/L
HBV SURFACE AG SERPL QL IA: NONREACTIVE
HCT VFR BLD AUTO: 26.5 %
HDLC SERPL-MCNC: 40 MG/DL (ref 40–59)
HGB BLD-MCNC: 8.6 G/DL
INR BLD: 1.28 (ref 0.8–1.2)
IRON SATN MFR SERPL: 24 %
IRON SERPL-MCNC: 40 UG/DL
LDLC SERPL CALC-MCNC: 108 MG/DL (ref ?–100)
MAGNESIUM SERPL-MCNC: 2.2 MG/DL (ref 1.6–2.6)
MCH RBC QN AUTO: 30.3 PG (ref 26–34)
MCHC RBC AUTO-ENTMCNC: 32.5 G/DL (ref 31–37)
MCV RBC AUTO: 93.3 FL
MRSA DNA SPEC QL NAA+PROBE: NEGATIVE
NONHDLC SERPL-MCNC: 124 MG/DL (ref ?–130)
OSMOLALITY SERPL CALC.SUM OF ELEC: 298 MOSM/KG (ref 275–295)
P AXIS: 67 DEGREES
P-R INTERVAL: 210 MS
PHOSPHATE SERPL-MCNC: 7 MG/DL (ref 2.4–5.1)
PLATELET # BLD AUTO: 172 10(3)UL (ref 150–450)
POTASSIUM SERPL-SCNC: 5.8 MMOL/L (ref 3.5–5.1)
PROT SERPL-MCNC: 6.8 G/DL (ref 5.7–8.2)
PROTHROMBIN TIME: 16.1 SECONDS (ref 11.6–14.8)
Q-T INTERVAL: 418 MS
QRS DURATION: 82 MS
QTC CALCULATION (BEZET): 488 MS
R AXIS: 7 DEGREES
RBC # BLD AUTO: 2.84 X10(6)UL
SODIUM SERPL-SCNC: 137 MMOL/L (ref 136–145)
T AXIS: -87 DEGREES
TOTAL IRON BINDING CAPACITY: 168 UG/DL (ref 250–425)
TRANSFERRIN SERPL-MCNC: 119 MG/DL (ref 215–365)
TRIGL SERPL-MCNC: 84 MG/DL (ref 30–149)
UNIT VOLUME: 350 ML
VENTRICULAR RATE: 82 BPM
VLDLC SERPL CALC-MCNC: 14 MG/DL (ref 0–30)
WBC # BLD AUTO: 11.1 X10(3) UL (ref 4–11)

## 2025-01-19 PROCEDURE — 71275 CT ANGIOGRAPHY CHEST: CPT | Performed by: SURGERY

## 2025-01-19 PROCEDURE — 99223 1ST HOSP IP/OBS HIGH 75: CPT | Performed by: INTERNAL MEDICINE

## 2025-01-19 PROCEDURE — 99291 CRITICAL CARE FIRST HOUR: CPT | Performed by: INTERNAL MEDICINE

## 2025-01-19 PROCEDURE — 5A1D70Z PERFORMANCE OF URINARY FILTRATION, INTERMITTENT, LESS THAN 6 HOURS PER DAY: ICD-10-PCS | Performed by: INTERNAL MEDICINE

## 2025-01-19 PROCEDURE — 82962 GLUCOSE BLOOD TEST: CPT

## 2025-01-19 PROCEDURE — 74174 CTA ABD&PLVS W/CONTRAST: CPT | Performed by: SURGERY

## 2025-01-19 PROCEDURE — 99233 SBSQ HOSP IP/OBS HIGH 50: CPT | Performed by: EMERGENCY MEDICINE

## 2025-01-19 RX ORDER — HYDROMORPHONE HYDROCHLORIDE 1 MG/ML
0.4 INJECTION, SOLUTION INTRAMUSCULAR; INTRAVENOUS; SUBCUTANEOUS EVERY 2 HOUR PRN
Status: DISCONTINUED | OUTPATIENT
Start: 2025-01-19 | End: 2025-01-23

## 2025-01-19 RX ORDER — HYDROMORPHONE HYDROCHLORIDE 1 MG/ML
0.8 INJECTION, SOLUTION INTRAMUSCULAR; INTRAVENOUS; SUBCUTANEOUS EVERY 2 HOUR PRN
Status: DISCONTINUED | OUTPATIENT
Start: 2025-01-19 | End: 2025-01-23

## 2025-01-19 RX ORDER — HYDROMORPHONE HYDROCHLORIDE 1 MG/ML
0.4 INJECTION, SOLUTION INTRAMUSCULAR; INTRAVENOUS; SUBCUTANEOUS EVERY 2 HOUR PRN
Status: DISCONTINUED | OUTPATIENT
Start: 2025-01-19 | End: 2025-01-19

## 2025-01-19 RX ORDER — SODIUM CHLORIDE 9 MG/ML
INJECTION, SOLUTION INTRAVENOUS CONTINUOUS
Status: DISCONTINUED | OUTPATIENT
Start: 2025-01-19 | End: 2025-01-19

## 2025-01-19 RX ORDER — ACETAMINOPHEN 500 MG
500 TABLET ORAL EVERY 4 HOURS PRN
Status: DISCONTINUED | OUTPATIENT
Start: 2025-01-19 | End: 2025-01-20

## 2025-01-19 RX ORDER — ONDANSETRON 2 MG/ML
4 INJECTION INTRAMUSCULAR; INTRAVENOUS EVERY 6 HOURS PRN
Status: DISCONTINUED | OUTPATIENT
Start: 2025-01-19 | End: 2025-01-19

## 2025-01-19 RX ORDER — SENNOSIDES 8.6 MG
17.2 TABLET ORAL NIGHTLY PRN
Status: DISCONTINUED | OUTPATIENT
Start: 2025-01-19 | End: 2025-01-25

## 2025-01-19 RX ORDER — HYDRALAZINE HYDROCHLORIDE 20 MG/ML
10 INJECTION INTRAMUSCULAR; INTRAVENOUS EVERY 4 HOURS PRN
Status: DISCONTINUED | OUTPATIENT
Start: 2025-01-19 | End: 2025-01-25

## 2025-01-19 RX ORDER — CARVEDILOL 25 MG/1
25 TABLET ORAL 2 TIMES DAILY WITH MEALS
COMMUNITY

## 2025-01-19 RX ORDER — ONDANSETRON 2 MG/ML
4 INJECTION INTRAMUSCULAR; INTRAVENOUS EVERY 6 HOURS PRN
Status: DISCONTINUED | OUTPATIENT
Start: 2025-01-19 | End: 2025-01-25

## 2025-01-19 RX ORDER — POLYETHYLENE GLYCOL 3350 17 G/17G
17 POWDER, FOR SOLUTION ORAL DAILY PRN
Status: DISCONTINUED | OUTPATIENT
Start: 2025-01-19 | End: 2025-01-25

## 2025-01-19 RX ORDER — HYDROMORPHONE HYDROCHLORIDE 1 MG/ML
0.2 INJECTION, SOLUTION INTRAMUSCULAR; INTRAVENOUS; SUBCUTANEOUS EVERY 2 HOUR PRN
Status: DISCONTINUED | OUTPATIENT
Start: 2025-01-19 | End: 2025-01-19

## 2025-01-19 RX ORDER — HEPARIN SODIUM 1000 [USP'U]/ML
1.5 INJECTION, SOLUTION INTRAVENOUS; SUBCUTANEOUS
Status: DISCONTINUED | OUTPATIENT
Start: 2025-01-19 | End: 2025-01-25

## 2025-01-19 RX ORDER — HEPARIN SODIUM 5000 [USP'U]/ML
5000 INJECTION, SOLUTION INTRAVENOUS; SUBCUTANEOUS EVERY 8 HOURS SCHEDULED
Status: DISCONTINUED | OUTPATIENT
Start: 2025-01-19 | End: 2025-01-19

## 2025-01-19 RX ORDER — HYDROCODONE BITARTRATE AND ACETAMINOPHEN 5; 325 MG/1; MG/1
1 TABLET ORAL EVERY 4 HOURS PRN
Status: DISCONTINUED | OUTPATIENT
Start: 2025-01-19 | End: 2025-01-19

## 2025-01-19 RX ORDER — PROCHLORPERAZINE EDISYLATE 5 MG/ML
5 INJECTION INTRAMUSCULAR; INTRAVENOUS EVERY 8 HOURS PRN
Status: DISCONTINUED | OUTPATIENT
Start: 2025-01-19 | End: 2025-01-25

## 2025-01-19 RX ORDER — AMLODIPINE BESYLATE 10 MG/1
10 TABLET ORAL DAILY
Status: DISCONTINUED | OUTPATIENT
Start: 2025-01-19 | End: 2025-01-25

## 2025-01-19 RX ORDER — HYDROMORPHONE HYDROCHLORIDE 1 MG/ML
0.2 INJECTION, SOLUTION INTRAMUSCULAR; INTRAVENOUS; SUBCUTANEOUS EVERY 2 HOUR PRN
Status: DISCONTINUED | OUTPATIENT
Start: 2025-01-19 | End: 2025-01-23

## 2025-01-19 RX ORDER — BISACODYL 10 MG
10 SUPPOSITORY, RECTAL RECTAL
Status: DISCONTINUED | OUTPATIENT
Start: 2025-01-19 | End: 2025-01-25

## 2025-01-19 RX ORDER — HYDRALAZINE HYDROCHLORIDE 100 MG/1
100 TABLET, FILM COATED ORAL 3 TIMES DAILY
COMMUNITY

## 2025-01-19 RX ORDER — ACETAMINOPHEN 325 MG/1
650 TABLET ORAL EVERY 4 HOURS PRN
Status: DISCONTINUED | OUTPATIENT
Start: 2025-01-19 | End: 2025-01-19

## 2025-01-19 RX ORDER — PROCHLORPERAZINE EDISYLATE 5 MG/ML
5 INJECTION INTRAMUSCULAR; INTRAVENOUS EVERY 8 HOURS PRN
Status: DISCONTINUED | OUTPATIENT
Start: 2025-01-19 | End: 2025-01-19

## 2025-01-19 RX ORDER — AMLODIPINE BESYLATE 10 MG/1
10 TABLET ORAL DAILY
COMMUNITY
End: 2025-01-25

## 2025-01-19 RX ORDER — HYDROMORPHONE HYDROCHLORIDE 1 MG/ML
0.8 INJECTION, SOLUTION INTRAMUSCULAR; INTRAVENOUS; SUBCUTANEOUS EVERY 2 HOUR PRN
Status: DISCONTINUED | OUTPATIENT
Start: 2025-01-19 | End: 2025-01-19

## 2025-01-19 RX ORDER — LAMOTRIGINE 150 MG/1
150 TABLET ORAL EVERY 12 HOURS
COMMUNITY

## 2025-01-19 RX ORDER — ACETAMINOPHEN 500 MG
500 TABLET ORAL EVERY 4 HOURS PRN
Status: DISCONTINUED | OUTPATIENT
Start: 2025-01-19 | End: 2025-01-19

## 2025-01-19 RX ORDER — HYDROCODONE BITARTRATE AND ACETAMINOPHEN 5; 325 MG/1; MG/1
2 TABLET ORAL EVERY 4 HOURS PRN
Status: DISCONTINUED | OUTPATIENT
Start: 2025-01-19 | End: 2025-01-19

## 2025-01-19 RX ADMIN — PROTAMINE SULFATE 20 MG: 10 INJECTION, SOLUTION INTRAVENOUS at 00:01:00

## 2025-01-19 NOTE — CONSULTS
[facility] Patient Status:  Inpatient    1985 MRN [id number]   Location [facility] 4SW-A Attending Dakota Gómez MD   Hosp Day # 0 PCP OREN GALAVIZ MD     Date of Admission: 2025  Admission Diagnosis: Aortic dissection (HCC) [I71.00]     History of Present Illness: 40 y/o with h/o HTN, ESRD on HD and SZR d/o who was admitted to [facility] with c/o LLE pain along with lower back pain.  Denies recent MVA/trauma/falls.  Has h/o similar pain in the past but not as severe and did not seek medical w/u.  In ER, pt underwent w/u including CTA C/A/P which revealed a large descending aortic dissection (Sarbjit type B) along with aortic thrombus and pulmonary edema.  Was evaluated by PV surgery and required emergent intervention.  - pt transferred to [place] from [place] for higher level of care.  - no recent N/V/D/dysuria/hemoptysis/f/c.  - Of note, pt was seen in ER 25 for persistent, uncontrolled HTN.  - has a h/o L MCA CVA, failed renal transplant      Past Medical History:    Essential hypertension    Seizure disorder (HCC)    Stroke (HCC)      Past Surgical History:   Procedure Laterality Date    Transplantation of kidney  2017       Allergies[1]NKDA     Social History:   reports that he has never smoked. He has never used smokeless tobacco.      Family History:  Reviewed. Per EMR     Current Medications:    Current Facility-Administered Medications:     niCARdipine in sodium chloride 0.86% (carDENE) 20 mg/200mL infusion premix, , ,      REVIEW OF SYSTEMS:   As listed in HPI, otherwise ten point ROS is negative.     OBJECTIVE:  /84   Pulse 89   Temp 97.8 °F (36.6 °C) (Temporal)   Resp 17   Wt 179 lb 14.3 oz (81.6 kg)   SpO2 94%   BMI 24.40 kg/m²        Wt Readings from Last 3 Encounters:   25 179 lb 14.3 oz (81.6 kg)   25 201 lb 15.1 oz (91.6 kg)   19 214 lb (97.1 kg)        No intake/output data recorded.  No  intake/output data recorded.     General appearance: alert, appears stated age, cooperative, and mild distress  Head: Normocephalic, without obvious abnormality, atraumatic  Neck: no adenopathy, no carotid bruit, and supple, symmetrical, trachea midline  Back: symmetric, no curvature. ROM normal. No CVA tenderness.  Lungs: diminished breath sounds bilaterally  Chest wall: no tenderness, R sided HD tunneled catheter noted  Heart: regular rate and rhythm  Abdomen: soft, non-tender; bowel sounds normal; no masses,  no organomegaly  Extremities: extremities normal, atraumatic, no cyanosis or edema  Skin: Skin color, texture, turgor normal. No rashes or lesions         No results found for: \"PT\", \"INR\"       Imaging: CTA chest personally reviewed:  CONCLUSION:   Markedly suboptimal artifactually degraded examination. Within these parameters:   1. Descending aortic dissection (likely Perry type B, without clear evidence of Perry type A involvement of the ascending thoracic aorta).      2. Aortic thrombus is suggested in the false lumen in the descending thoracic aorta.      3. Suspected severe bilateral pulmonary interstitial edema, with or without concurrent infectious process.      4. Bilateral pleural effusions.      5. No evidence of acute central pulmonary embolism involving the main pulmonary artery branches, but without diagnostic assessment of lobar, segmental, or subsegmental levels.      6. Dilatation of the main pulmonary artery trunk may relate to underlying pulmonary hypertension.       7. Ascending thoracic aortic ectasia.      8. Mediastinal lymphadenopathy, perhaps reactive.      ASSESSMENT/PLAN:  HTN emergency - with associated pulmonary edema and dissection of descending aorta  - tight BP control with goal -180  - cardene gtt prn  - as OP, on amlodipine, coreg, hydralazine tid- will resume slowly as BP allows  Descending aortic dissection (Sarbjit type B)  - s/p endovascular repair, plasty,  stenting of aorta, bilateral iliac arteries (1/19)  - PV surgery managing closely  - BP control, as mentioned above  - analgesic support  ESRD on HD, failed renal transplant 2017  - MWF HD; however, pt will need HD today, given volume load, pulm edema and persistent HTN  - consult renal.  Follows through [facility] typically  Acute hypoxic resp failure - extubated in PACU; requiring O2 via NC  - due to pulm edema  - volume mgt per renal   - NIPPV prn for increased WOB  Neuro- h/o CVA and subsequent seizure d/o  - no signs of active seizure  - resume lamictal  - monitor  F/E/N- minimize IVFs, lytes per protocol, ADAT  Proph- start subcut hep when OK with PV surgery  Dispo - Full code  - pt is critically ill at risk for further deterioration    Upon my evaluation, this patient had a high probability of imminent or life-threatening deterioration due to hypertension emergency, which required my direct attention, intervention, and personal management.    I have personally provided 53 minutes of critical care time, exclusive of time spent on separately billable procedures.  Time includes review of all pertinent laboratory/radiology results, discussion with consultants, and monitoring for potential decompensation.  Performed interventions included vasoactive medications.    Mark Anthony Gallagher MD  1/19/2025  3:20 AM        [1] No Known Allergies

## 2025-01-19 NOTE — CONSULTS
Vascular Surgery  New Patient Consultation    Name: Christian Hernandez  MRN: [id number]  : 1985    Referring Provider: Dakota Gómez    CC: Type B aortic dissection    HPI: 39-year-old male with history of hypertension, incisional disease on dialysis, previous failed renal transplant who presents with severe lower abdominal pain/back pain with leg pain.  He states that this began approximately 2 weeks ago but over the last 2 days it had worsened to the point where today it was severe and prompted his evaluation.  He endorses significant numbness to the left leg with tingling and some subjective weakness.  He is limited severely by the pain as well.  He also has had some shortness of breath that is worsened over the last 2 days.  She does not smoke.    Past Medical History:    Essential hypertension    Seizure disorder (HCC)    Stroke (HCC)       Past Surgical History:   Procedure Laterality Date    Transplantation of kidney  2017       Allergies[1]    Medications Ordered Prior to Encounter[2]    Social History     Socioeconomic History    Marital status:    Tobacco Use    Smoking status: Never    Smokeless tobacco: Never        No family history on file.    ROS:  See HPI above for pertinent positives and negatives.   Constitutional: No fevers, chills, fatigue or night sweats.  ENT: No mouth pain, neck pain, running nose, headaches or swollen glands.  Skin: No rashes, pruritus or skin changes,  Respiratory: Denies cough, wheezing or shortness of breath.  CV: Denies chest pain, palpitations, orthopnea, PND or dizziness.  Musculoskeletal: No joint pain, stiffness or swelling.  GI: No nausea, vomiting or diarrhea. No blood in stools.  Neurologic: No seizures, tremors, weakness or numbness.     Physical Examination  Vitals:    25 0746 25 0747 25 0800 25 0900   BP:  127/75 127/75 154/78   BP Location:       Pulse: 90 89 89 91   Resp: 14 15 13 16   Temp: 98.8 °F (37.1 °C)      TempSrc:  Temporal      SpO2: 93% 92% 93% 91%   Weight:          Skin no rashes or skin lesions identified  Neck supple; no LAD; trachea midline  RRR  CTAB; breathing comfortably  Abd soft, NT, ND;  no palpable abdominal masses.  RUE: Palpable radial and ulnar pulse  LUE: Palpable radial and ulnar pulse  RLE: faint Fem, nonpalp DP, nonpalp PT   LLE: faint  Fem, nonpalp DP, nonpalp PT   Neurologic: CN II-XII grossly intact  5/5 sensorimotor function in the bilateral upper and lower extremities except for strength 4 out of 5 in the left lower extremity.  Significant pain with movement of the leg.  Significant numbness throughout the entirety of the left leg as well.    Labs:  Lab Results   Component Value Date    WBC 11.1 (H) 01/19/2025    HGB 8.6 (L) 01/19/2025    HCT 26.5 (L) 01/19/2025    .0 01/19/2025    NEPERCENT 70.8 01/18/2025    LYPERCENT 7.9 01/18/2025    MOPERCENT 19.1 01/18/2025    EOPERCENT 1.6 01/18/2025    BAPERCENT 0.2 01/18/2025    NE 6.74 01/18/2025    LYMABS 0.75 (L) 01/18/2025    MOABSO 1.82 (H) 01/18/2025    EOABSO 0.15 01/18/2025    BAABSO 0.02 01/18/2025       Imaging: CTA: Imaging reviewed.  See final report for the details.  Evidence of type B aortic dissection with thrombus in the false lumen in the descending thoracic aorta.  Evidence of severe true lumen compression with occlusion of the left external iliac and bilateral common femorals.    Assessment and Plan:  39 year old male with numerous comorbidities who presents with complicated type B dissection with malperfusion.  Given his symptoms of limb ischemia I have recommended emergent thoracic endovascular aortic repair with iliac stenting.  I discussed the risk and benefits of the procedure not limited to the risk of bleeding, infection, stroke, MI, rupture, spinal cord ischemia, limb loss, death.  He affirms his understanding.  His wife also on the phone affirms understanding and agrees.  Consent obtained.  All questions and concerns were  addressed.  Proceed to the OR as above.      Thank you for allowing me to participate in this patient's care.    Dakota Gómez MD  [facility]  Vascular Surgery         [1] No Known Allergies  [2]   No current facility-administered medications on file prior to encounter.     Current Outpatient Medications on File Prior to Encounter   Medication Sig Dispense Refill    NON FORMULARY 2 (two) times daily. Nifedipine unknown dose      NON FORMULARY keppra unknown dose      traMADol HCl 50 MG Oral Tab Take 50 mg by mouth every 6 (six) hours as needed for Pain.      NON FORMULARY Anti rejection med for kidney transplant

## 2025-01-19 NOTE — ANESTHESIA PROCEDURE NOTES
Peripheral IV  Date/Time: 1/18/2025 9:25 PM  Inserted by: Tonny Flor DO    Placement  Needle size: 18 G  Laterality: left  Location: hand  Local anesthetic: none  Site prep: alcohol  Technique: anatomical landmarks

## 2025-01-19 NOTE — PROGRESS NOTES
Vascular surgery progress note    Patient seen and examined.  No significant pain other than some groin discomfort.  Vitals stable and within desired limits.  On examination, groin flat with no hematoma.  Neurologically intact in the lower extremities.  Tolerating p.o.  Will have the patient undergo half session of dialysis today and tomorrow.  Will continue permissive hypertension for spinal cord ischemia protection.  He will remain in the ICU today for neurochecks.  Will continue to monitor his progress.  Plan for CTA today after dialysis to assess full extent of repair.  Will continue to follow closely.  Communicated with the patient and his mother today who is at the bedside.

## 2025-01-19 NOTE — ANESTHESIA PREPROCEDURE EVALUATION
Anesthesia PreOp Note    HPI:     Christian Hernandez is a 39 year old male who presents for preoperative consultation requested by: * Surgery not found *    Date of Surgery:       Indication: * No surgery found *    Relevant Problems   No relevant active problems       NPO:                         History Review:  Patient Active Problem List    Diagnosis Date Noted    Aortic dissection (HCC) 01/18/2025       Past Medical History:    Essential hypertension    Seizure disorder (HCC)    Stroke (HCC)       Past Surgical History:   Procedure Laterality Date    Transplantation of kidney  2017       Prescriptions Prior to Admission[1]  Current Medications and Prescriptions Ordered in Epic[2]    Allergies[3]    No family history on file.  Social History     Socioeconomic History    Marital status:    Tobacco Use    Smoking status: Never    Smokeless tobacco: Never       Available pre-op labs reviewed.  Lab Results   Component Value Date    WBC 9.5 01/18/2025    RBC 3.00 (L) 01/18/2025    HGB 9.0 (L) 01/18/2025    HCT 27.4 (L) 01/18/2025    MCV 91.3 01/18/2025    MCH 30.0 01/18/2025    MCHC 32.8 01/18/2025    RDW 15.8 (H) 01/18/2025    .0 01/18/2025     Lab Results   Component Value Date     01/18/2025    K 5.1 01/18/2025     01/18/2025    CO2 29.0 01/18/2025    BUN 48 (H) 01/18/2025    CREATSERUM 10.37 (H) 01/18/2025     (H) 01/18/2025    PGLU 102 (H) 01/18/2025    CA 8.6 (L) 01/18/2025          Vital Signs:  Body mass index is 27.39 kg/m².   height is 1.829 m (6') and weight is 91.6 kg (201 lb 15.1 oz). His temporal temperature is 99 °F (37.2 °C). His blood pressure is 158/94 (abnormal) and his pulse is 88. His respiration is 26 and oxygen saturation is 91%.   Vitals:    01/18/25 2010 01/18/25 2015 01/18/25 2033 01/18/25 2036   BP: (!) 186/109 (!) 177/107 (!) 159/99 (!) 158/94   Pulse: 88 88 88    Resp: 23 26     Temp:       TempSrc:       SpO2: (!) 89% 91%     Weight:       Height:             Anesthesia Evaluation      Airway   Mallampati: III  TM distance: >3 FB  Neck ROM: full  Dental      Pulmonary - normal exam   Cardiovascular   (+) hypertension    ROS comment: Aortic dissection      Narrative  PROCEDURE: CT ANGIOGRAPHY CHEST (CPT=71275)     COMPARISON: [facility], CTA ABDOMEN/PELVIS LOWER EXT BILAT W RUNOFF (UXH=68382), 1/18/2025, 6:41 PM.     INDICATIONS: Aortic dissection.     TECHNIQUE: Multidetector CT images of the chest were obtained with non-ionic intravenous contrast material. Automated exposure control for dose reduction was used. Adjustment of the mA and/or kV was done based on the patient's size. Iterative  reconstruction technique for dose reduction was employed. Dose information was transmitted to the ACR (American College of Radiology) NRDR (National Radiology Data Registry), which includes the Dose Index Registry. Multiplanar reformats and maximum  intensity projection images were created.       FINDINGS:  COMMENT: There is suboptimal arterial-phase enhancement. Overall examination quality is degraded by beam hardening artifact throughout the imaged volume secondary to the patient's bilateral arm-down positioning. Images are further significantly  compromised by patient motion artifact.  DEVICES: There is a right-sided large-bore dual lumen hemodialysis catheter with tip terminating near the cavoatrial junction.  CARDIAC: The heart is enlarged. There is no bowing of the interventricular septum to suggest right ventricular strain. Atherosclerotic vascular calcifications are present in the coronary vessels.  THORACIC AORTA: Normal-variant configuration with a shared origin of the brachiocephalic trunk and left common carotid artery and with the left vertebral artery arising directly from the arch is seen; a descending thoracic aortic dissection flap is  evident. In the false lumen, there is lobular thrombus measuring up to 2.9 x 3.0 x 1.6 cm (series 3, image 36; series  6, image 96).  The dissection flap propagates caudally into the abdominal aorta; please see the separately dictated report for the previously performed CT angiography of the abdomen for further details.  The ascending thoracic aorta is ectatic in caliber, measuring 3.9 x 3.7 cm.  VASCULATURE: There is adequate opacification of the pulmonary arterial tree. No suspicious filling defects are identified in the main pulmonary artery branches to suggest acute central pulmonary embolism. The lobar, segmental, and subsegmental branches  are less well assessed. The main pulmonary artery trunk is substantially dilated in caliber, measuring 3.7 cm.  LUNGS/PLEURA: Extensive partially confluent ground-glass opacities are seen throughout the lungs bilaterally. There is concurrent interlobular septal thickening with a relative basilar predominance. Small bilateral pleural effusions are seen with  associated compressive atelectasis, with or without superimposed airspace consolidation. Additional scattered ground-glass and reticular opacities are present and may be atelectatic in origin. No pneumothorax is detected.    AIRWAYS: Diffuse bronchial wall thickening is demonstrated. The tracheobronchial tree is without central mass or obstructing lesion.  MEDIASTINUM/JOEY: Right upper paratracheal lymphadenopathy measures 2.6 x 2.0 cm. Right lower paratracheal lymph nodes measure up to 1.6 x 2.0 cm.  CHEST WALL: Prominent bilateral gynecomastia is demonstrated. Dependent subcutaneous edema is seen. No axillary mass or lymphadenopathy.    LIMITED ABDOMEN: Please see the separately dictated report for the recently performed CT angiography of the abdomen, pelvis, and bilateral lower extremities for further details.  BONES: The osseous structures have a diffusely sclerotic appearance, which may reflect renal osteodystrophy.  OTHER: A small hiatal hernia is evident.                 Impression  CONCLUSION:  Markedly suboptimal artifactually  degraded examination. Within these parameters:  1. Descending aortic dissection (likely [place] type B, without clear evidence of [place] type A involvement of the ascending thoracic aorta).     2. Aortic thrombus is suggested in the false lumen in the descending thoracic aorta.     3. Suspected severe bilateral pulmonary interstitial edema, with or without concurrent infectious process.     4. Bilateral pleural effusions.     5. No evidence of acute central pulmonary embolism involving the main pulmonary artery branches, but without diagnostic assessment of lobar, segmental, or subsegmental levels.     6. Dilatation of the main pulmonary artery trunk may relate to underlying pulmonary hypertension.       7. Ascending thoracic aortic ectasia.     8. Mediastinal lymphadenopathy, perhaps reactive.     9. Lesser incidental findings as above.           Neuro/Psych    (+)  seizures, CVA,        GI/Hepatic/Renal    (+) chronic renal disease ESRD    Comments: Last dialysis yesterday    Endo/Other    Abdominal   (-) obese                 Anesthesia Plan:   ASA:  4  Emergent    Plan:   General  Monitors and Lines:   Additonal IV and A-line  Airway:  ETT  Informed Consent Plan and Risks Discussed With:  Patient  Use of Blood Products Discussed With:  Patient  Consent Comment: I have discussed the anesthetic plan, major risks and alternatives with the patient and answered all questions.  Minor and major side effects were discussed with patient, including but not limited to: injury to teeth/gums/lips, aspiration, nausea/vomiting postoperatively, anaphylaxis, heart attack, stroke, post-operative ventilation and death. The patient desires to proceed with surgery and anesthesia as planned. All questions answered.       I have informed Christian Hernandez and/or legal guardian or family member of the nature of the anesthetic plan, benefits, risks including possible dental damage if relevant, major complications, and any alternative  forms of anesthetic management.   All of the patient's questions were answered to the best of my ability. The patient desires the anesthetic management as planned.  Tonny Flor, DO  1/18/2025 8:43 PM  Present on Admission:  **None**           [1] (Not in a hospital admission)  [2]   Current Facility-Administered Medications Ordered in Epic   Medication Dose Route Frequency Provider Last Rate Last Admin    esmolol (Brevibloc) 2000 mg/100mL infusion premix   mcg/kg/min Intravenous Continuous Polina Pelletier MD 72.3 mL/hr at 01/18/25 2036 300 mcg/kg/min at 01/18/25 2036    heparin (Porcine) 81038 units/250mL infusion ED INITIAL DOSE  1,000 Units/hr Intravenous Once Polina Pelletier MD 10 mL/hr at 01/18/25 2006 1,000 Units/hr at 01/18/25 2006    [START ON 1/19/2025] heparin (Porcine) 51165 units/250mL infusion ACS/AFIB CONTINUOUS  200-3,000 Units/hr Intravenous Continuous Polina Pelletier MD        clevidipine (Cleviprex) 25 MG/50ML IV infusion  1-21 mg/hr Intravenous Once Trina Olmos MD        norepinephrine (Levophed) 4 mg/250mL infusion premix  0.5-50 mcg/min Intravenous Continuous Trina Olmos MD        niCARdipine in sodium chloride 0.86% (carDENE) 20 mg/200mL infusion premix  5-15 mg/hr Intravenous Continuous Polina Pelletier MD 50 mL/hr at 01/18/25 2041 5 mg/hr at 01/18/25 2041     Current Outpatient Medications Ordered in Epic   Medication Sig Dispense Refill    NON FORMULARY 2 (two) times daily. Nifedipine unknown dose      NON FORMULARY keppra unknown dose      traMADol HCl 50 MG Oral Tab Take 50 mg by mouth every 6 (six) hours as needed for Pain.      NON FORMULARY Anti rejection med for kidney transplant     [3] No Known Allergies

## 2025-01-19 NOTE — CM/SW NOTE
Received a call from Dr. JENNIFER Frias that patient is in cath lab with Dr. Gómez and requested EDCM to arrange ambulance to transport patient from [place] to [place] ICU after surgery.     Per Dr. JENNIFER Frias, Dr. Gómez spoke to  ICU intensivist Dr. Gallagher.    [facility] Supervisor Elenita HEARN updated and gave this EDCM the Room Number and accepting ICU RN number.  Patient is going to ICU Room 455 and ICU RN is Kristine [phone].    [facility] ambulance arranged for will call. Spoke to [facility] , [facility] will be at [facility] on standby in 45 minutes. Per Heidi, SARAHI to call dispatcher once patient is ready to transfer.     Above transfer details given to  [facility] Surgical Coordinator Jonas

## 2025-01-19 NOTE — H&P (VIEW-ONLY)
[facility]  Report of Consultation    Christian Hernandez Patient Status:  Inpatient    1985 MRN [id number]   Location [facility] 4SW-A Attending Dakota Gómez MD   Hosp Day # 0 PCP OREN GALAVIZ MD       Assessment / Plan:    1) ESRD- failed kidney transplant; lytes / volume OK. HD today (off schedule) for hyperkalemia    2) Descending aortic dissection with thrombus -> endovascular repair / PTA + stenting AO / bilat iliacs overnight by Dr. Gómez    3) s/p DDKT  with Ab mediated rejection  -> PLEX + IVIG followed by acute cellular rejection  -> ESRD    4) Longstanding HTN    5) Anemia- due to CKD / post-op; EPO with HD    6) Seizure disorder      Reason for Consultation:  ESRD    History of Present Illness:  Christian Hernandez is a a(n) 39 year old male.  39-year-old male with a history of end-stage renal disease presumably due to longstanding hypertension or glomerulonephritis undergoing  donor kidney transplant 2017 complicated by antibody mediated rejection followed by acute cellular rejection has been on dialysis over the past 1 to 2 years presents to [facility] with abdominal pain found to have a large descending aortic dissection with aortic thrombus.  Underwent complex endovascular repair with angioplasty and stenting of the aorta and bilateral iliacs overnight by Dr. Gómez.  Last dialysis on Friday.      History:  Past Medical History:    Essential hypertension    Seizure disorder (HCC)    Stroke (HCC)     Past Surgical History:   Procedure Laterality Date    Transplantation of kidney  2017     No family history on file.  Denies family history of kidney disease.    reports that he has never smoked. He has never used smokeless tobacco.    Allergies:  Allergies[1]    Medications:    Current Facility-Administered Medications:     prochlorperazine (Compazine) 10 MG/2ML injection 5 mg, 5 mg, Intravenous, Q8H PRN    HYDROmorphone (Dilaudid) 1 MG/ML  injection 0.2 mg, 0.2 mg, Intravenous, Q2H PRN **OR** HYDROmorphone (Dilaudid) 1 MG/ML injection 0.4 mg, 0.4 mg, Intravenous, Q2H PRN **OR** HYDROmorphone (Dilaudid) 1 MG/ML injection 0.8 mg, 0.8 mg, Intravenous, Q2H PRN    lamoTRIgine (LaMICtal) tab 150 mg, 150 mg, Oral, BID    niCARdipine in sodium chloride 0.86% (carDENE) 20 mg/200mL infusion premix, 5-15 mg/hr, Intravenous, Continuous    hydrALAzine (Apresoline) 20 mg/mL injection 10 mg, 10 mg, Intravenous, Q4H PRN    melatonin tab 3 mg, 3 mg, Oral, Nightly PRN    acetaminophen (Tylenol Extra Strength) tab 500 mg, 500 mg, Oral, Q4H PRN    polyethylene glycol (PEG 3350) (Miralax) 17 g oral packet 17 g, 17 g, Oral, Daily PRN    sennosides (Senokot) tab 17.2 mg, 17.2 mg, Oral, Nightly PRN    bisacodyl (Dulcolax) 10 MG rectal suppository 10 mg, 10 mg, Rectal, Daily PRN    ondansetron (Zofran) 4 MG/2ML injection 4 mg, 4 mg, Intravenous, Q6H PRN  No current outpatient medications on file.       Review of Systems:  Please see HPI for pertinent positives. 10 point review of systems otherwise reviewed and negative.     Physical Exam:  /75   Pulse 89   Temp 98.8 °F (37.1 °C) (Temporal)   Resp 13   Wt 179 lb 14.3 oz (81.6 kg)   SpO2 93%   BMI 24.40 kg/m²   Temp (24hrs), Av.1 °F (36.7 °C), Min:96.7 °F (35.9 °C), Max:99 °F (37.2 °C)     No intake or output data in the 24 hours ending 25 0841  Wt Readings from Last 3 Encounters:   25 179 lb 14.3 oz (81.6 kg)   25 201 lb 15.1 oz (91.6 kg)   19 214 lb (97.1 kg)     General: awake pretty alert  HEENT: No scleral icterus, MMM  Neck: Supple, no CJ or thyromegaly  Cardiac: Regular rate and rhythm, S1, S2 normal, no murmur, rub, or gallop  Lungs: Decreased breath sounds at the bases bilaterally.   Abdomen: Soft, non-tender. + bowel sounds, no palpable organomegaly  Extremities: Without clubbing, cyanosis; no edema  Neurologic: Cranial nerves grossly intact, moving all extremities  Skin: Warm  and dry, no rashes      Laboratory Data:  Lab Results   Component Value Date    WBC 11.1 01/19/2025    HGB 8.6 01/19/2025    HCT 26.5 01/19/2025    .0 01/19/2025    CREATSERUM 10.96 01/19/2025    BUN 51 01/19/2025     01/19/2025    K 5.8 01/19/2025     01/19/2025    CO2 26.0 01/19/2025     01/19/2025    CA 8.2 01/19/2025    ALB 3.9 01/19/2025    ALKPHO 68 01/19/2025    BILT 0.2 01/19/2025    TP 6.8 01/19/2025    AST 11 01/19/2025    ALT <7 01/19/2025    INR 1.28 01/19/2025    PTP 16.1 01/19/2025    MG 2.2 01/19/2025    PHOS 7.0 01/19/2025       Imaging:  All imaging studies reviewed.      Thank you for allowing me to participate in the care of your patient.    John Madera MD  1/19/2025  8:41 AM         [1] No Known Allergies

## 2025-01-19 NOTE — PLAN OF CARE
Received patient from EMS around 0300. Patient lethargic, but oriented X4, FC and MILLER. Afebrile. NSR on tele. BP elevated. To keep -180, cardene restarted. PRN hydralazine given. Neurovascular checks q4hr, see flowsheets. Bilateral groin sites soft, no hematoma. C/o pain to groin, PRN dilaudid given. Unable to complete admission navigator. Patient unwilling to participate, does not know home meds and no family available, will pass on to dayshift. See flowsheets and MAR.

## 2025-01-19 NOTE — PROGRESS NOTES
Please see overnight intensivist note    39-year-old male type B dissection now status post endovascular repair stenting of aorta bilateral iliac arteries    1.  Neurologic current pain continue IV Dilaudid history of seizures continue seizure meds    2.  Hypoxemic respiratory failure on 6 L nasal cannula reviewed CT chest with pulmonary edema bedside ultrasound verifies comet tail artifact would likely benefit from dialysis await nephrology    3.  Cardiovascular hypertensive crisis currently on nicardipine 15 mg/h blood pressure goals 1 40-1 80 we will reinstitute meds judiciously over the next 24 hours which include amlodipine Coreg and hydralazine    4.  GI/vascular descending aortic dissection status post endovascular repair stenting of aorta bilateral iliac arteries\groins without hematoma  Good distal pulses pain control    5 renal end-stage renal disease previous failed kidney transplant has a right chest wall catheter usually Monday Wednesday Friday but would benefit from dialysis today    6.  Infectious disease no fever n mild leukocytosis no reason for antibiotics    7.  Hematology anemia of chronic disease stable no significant thrombocytopenia would like to initiate DVT prophylaxis today after talking to vascular    8.  Endocrine no history of endocrinopathy    35 minutes follow-up care time spent with this patient with respect to management of vasoactive drip specifically with strict blood pressure requirements

## 2025-01-19 NOTE — ANESTHESIA PROCEDURE NOTES
Arterial Line    Date/Time: 1/18/2025 9:18 PM    Performed by: Tonny Flor DO  Authorized by: Tonny Flor DO    General Information and Staff    Procedure Start:  1/18/2025 9:18 PM  Procedure End:  1/18/2025 9:20 PM  Anesthesiologist:  Trina Olmos MD  Performed By:  Anesthesiologist  Patient Location:  OR  Indication: continuous blood pressure monitoring and blood sampling needed    Site Identification: real time ultrasound guided and surface landmarks    Preanesthetic Checklist: 2 patient identifiers, IV checked, risks and benefits discussed, monitors and equipment checked, pre-op evaluation, timeout performed, anesthesia consent and sterile technique used    Procedure Details    Catheter Size:  20 G  Catheter Length:  1 and 3/4 inch  Catheter Type:  Arrow  Seldinger Technique?: Yes    Laterality:  Right  Site:  Radial artery  Site Prep: chlorhexidine    Line Secured:  Wrist Brace, tape and Tegaderm    Assessment    Events: patient tolerated procedure well with no complications      Medications  1/18/2025 9:18 PM  lidocaine injection 1% - Intradermal   2 mL - 1/18/2025 9:18:00 PM    Additional Comments

## 2025-01-19 NOTE — ANESTHESIA POSTPROCEDURE EVALUATION
Patient: Christian Hernandez    Procedure Summary       Date: 01/18/25 Room / Location: [facility]    Anesthesia Start: 2109 Anesthesia Stop:     Procedure: CATH ABDOMINAL AORTIC ANEURYSM Diagnosis: (Aortic disection)    Scheduled Providers:  Anesthesiologist: Tonny Flor DO    Anesthesia Type: general ASA Status: 4 - Emergent            Anesthesia Type: No value filed.    Vitals Value Taken Time   /95 01/19/25 0043   Temp 97.5 01/19/25 0044   Pulse 84 01/19/25 0044   Resp 21 01/19/25 0044   SpO2 98 % 01/19/25 0044   Vitals shown include unfiled device data.    [facility] AN Post Evaluation:   Patient Evaluated in ICU  Patient Participation: complete - patient participated  Level of Consciousness: sleepy but conscious  Pain Management: adequate  Airway Patency:patent  Yes    Nausea/Vomiting: none  Cardiovascular Status: acceptable  Respiratory Status: acceptable and face mask  Postoperative Hydration acceptable      Tonny Flor DO  1/19/2025 12:44 AM

## 2025-01-19 NOTE — PROGRESS NOTES
Patient received from cath lab for recovery and then transferred to [place] [facility] @approx 2am.      Patient is alert with palpable pedal pulses.  Bilateral groin sites intact with no hematoma noted.  Cardene gtt titrated to maintain -180.

## 2025-01-19 NOTE — CONSULTS
[place] [facility]ITAL[facility]  CONSULT     Christian Hernandez Patient Status:  Inpatient    1985 MRN [id number]   Location [facility] 4SW-A Attending Dakota Gómez MD   Hosp Day # 0 PCP OREN GALAVIZ MD     Reason for consult: medical management    Requested by: Dakota Gómez MD     History of Present Illness: Christian Hernandez is a 39 year old male with pmhx significant for HTN, ESRD s/p failed renal transplant, CVA, seizure disorder who was transferred from [facility] for endovascular repair of large descending aortic dissection with aortic thrombus. He presented last evening to [place] ED with complaint of abdominal pain, BLLE pain, weakness, and paraesthesias. His CT chest/AP showed descending aortic dissection with aortic thrombus and severe BL pulmonary edema. Pt underwent thoracic endovascular aortic repair, angioplasty/stenting of aorta and R/L common iliac aa., R/L external iliac aa. procedure earlier today with no complications. Currently, he reports he continues to have abdominal pain and BLLE pain. He is requesting more IV pain meds, but falls asleep mid-sentences. He otherwise denies any chest pain/pressure, SOB, n/v/d. Able to tolerate PO and denies any difficulty swallowing.     Past Medical History:  Past Medical History:    Essential hypertension    Seizure disorder (HCC)    Stroke (HCC)        Past Surgical History:   Past Surgical History:   Procedure Laterality Date    Transplantation of kidney         Social History:  reports that he has never smoked. He has never used smokeless tobacco.    Family History: No family history on file.     Allergies: Allergies[1]    Medications:  Medications Ordered Prior to Encounter[2]    Review of Systems:   A comprehensive 14 point review of systems was completed.    Pertinent positives and negatives noted in the HPI.    Physical Exam:    /82   Pulse 86   Temp 97.8 °F (36.6 °C) (Temporal)   Resp 14   Wt 179 lb 14.3 oz (81.6  kg)   SpO2 97%   BMI 24.40 kg/m²   General: No acute distress. Alert and oriented x 3.  Respiratory: Clear to auscultation bilaterally. No wheezes. No rhonchi.  Cardiovascular: S1, S2. Regular rate and rhythm. No murmurs, rubs or gallops. Equal pulses.   Abdomen: Soft, nontender, nondistended.  Positive bowel sounds. No rebound, guarding or organomegaly.  Neurologic: No focal neurological deficits. CNII-XII grossly intact.  Musculoskeletal: Moves all extremities.  Extremities: No edema or cyanosis. BL groin sites with bandage c/d/I, no ecchymosis or hematoma       Diagnostic Data:      Labs:  Recent Labs   Lab 01/18/25  1709 01/19/25  0416   WBC 9.5 11.1*   HGB 9.0* 8.6*   MCV 91.3 93.3   .0 172.0       Recent Labs   Lab 01/18/25  1709   *   BUN 48*   CREATSERUM 10.37*   CA 8.6*   ALB 3.9      K 5.1      CO2 29.0   ALKPHO 73   AST 10   ALT 7*   BILT 0.3   TP 7.0       No results for input(s): \"PTP\", \"INR\" in the last 168 hours.    COVID-19 Lab Results    COVID-19  Lab Results   Component Value Date    COVID19 Not Detected 01/18/2025       Pro-Calcitonin  No results for input(s): \"PCT\" in the last 168 hours.    Cardiac  No results for input(s): \"TROP\", \"PBNP\" in the last 168 hours.    Creatinine Kinase  Recent Labs   Lab 01/18/25  1709          Inflammatory Markers  No results for input(s): \"CRP\", \"NICHOLAS\", \"LDH\", \"DDIMER\" in the last 168 hours.    Imaging: Imaging data reviewed in Epic.      ASSESSMENT / PLAN:     #Descending Type B aortic dissection with severe compression/malperfusion of BLLE  - s/p thoracic endovascular aortic repair, angioplasty/stenting of aorta and R/L common iliac aa., R/L external iliac aa.  on 1/19   - vascular surgery primary  - Pain control, anti-emetics PRN  - DVT ppx per primary service    #HTN urgency  - goal sBP 140-180  - cardene drip   - resume PTA meds  when med rec completed     #Acute hypoxic respiratory failure  - volume management with HD  - wean  supplemental O2 as tolerated   - pulm following    #Leukocytosis, mild; suspect reactive  - repeat CBC    #Acute anemia  - iron studies ordered  - follow CBC    #ESRD with history of failed renal transplant  - nephrology consult  - HD per nephro    #Hx of CVA  #Cerebrovascular disease  #Seizure disorder  - lamotrigine      Quality:  DVT Prophylaxis: SCD's, per primary service  CODE status: FULL  Rangel: no    Plan of care discussed with pt, RN    Day Lee,   1/19/2025    **Certification      PHYSICIAN Certification of Need for Inpatient Hospitalization - Initial Certification    Patient will require inpatient services that will reasonably be expected to span two midnight's based on the clinical documentation in H+P.   Based on patients current state of illness, I anticipate that, after discharge, patient will require TBD.           [1] No Known Allergies  [2]   Current Facility-Administered Medications on File Prior to Encounter   Medication Dose Route Frequency Provider Last Rate Last Admin    [COMPLETED] morphINE PF 4 MG/ML injection 4 mg  4 mg Intravenous Once Polina Pelletier MD   4 mg at 01/18/25 1734    [COMPLETED] ondansetron (Zofran) 4 MG/2ML injection 4 mg  4 mg Intravenous Once Polina Pelletier MD   4 mg at 01/18/25 1741    [COMPLETED] hydrALAzine (Apresoline) 20 mg/mL injection 15 mg  15 mg Intravenous Once Polina Pelletier MD   15 mg at 01/18/25 1907    [COMPLETED] iopamidol 76% (ISOVUE-370) injection for power injector  80 mL Intravenous ONCE PRN Polina Pelletier MD   80 mL at 01/18/25 1855    [COMPLETED] fentaNYL (Sublimaze) 50 mcg/mL injection 50 mcg  50 mcg Intravenous Once Polina Pelletier MD   50 mcg at 01/18/25 1915    [COMPLETED] iopamidol 76% (ISOVUE-370) injection for power injector  80 mL Intravenous ONCE PRN Polina Pelletier MD   80 mL at 01/18/25 1940    [COMPLETED] furosemide (Lasix) 10 mg/mL injection 80 mg  80 mg Intravenous Once Polina Pelletier MD   80 mg at 01/18/25 1948    [COMPLETED]  nitroGLYCERIN (Nitrobid) 2 % ointment 1 inch  1 inch Topical Once Polina Pelletier MD   1 inch at 01/18/25 1947    [COMPLETED] heparin (Porcine) 48998 units/250mL infusion ED INITIAL DOSE  1,000 Units/hr Intravenous Once Polina Pelletier MD 10 mL/hr at 01/18/25 2006 1,000 Units/hr at 01/18/25 2006    [COMPLETED] heparin (Porcine) 1000 UNIT/ML injection             [COMPLETED] heparin in sodium chloride 0.9% (Porcine) 2 Units/mL flush bag premix             [COMPLETED] lidocaine (Xylocaine) 2 % injection             [COMPLETED] ceFAZolin (Ancef) 2 g/10mL IV syringe premix             [COMPLETED] fentaNYL (Sublimaze) 50 mcg/mL injection             [COMPLETED] heparin in sodium chloride 0.9% (Porcine) 2 Units/mL flush bag premix             [COMPLETED] heparin (Porcine) 1000 UNIT/ML injection             [COMPLETED] lidocaine (Xylocaine) 1 % injection   Intradermal  Tonny Flor DO   2 mL at 01/18/25 2118    [COMPLETED] heparin in sodium chloride 0.9% (Porcine) 2 Units/mL flush bag premix             [COMPLETED] protamine 10 mg/mL injection             [COMPLETED] iopamidol (ISOVUE-300) 61 % injection 400 mL  400 mL Injection ONCE PRN Dakota Gómez MD   280 mL at 01/18/25 4823     Current Outpatient Medications on File Prior to Encounter   Medication Sig Dispense Refill    NON FORMULARY 2 (two) times daily. Nifedipine unknown dose      NON FORMULARY keppra unknown dose      traMADol HCl 50 MG Oral Tab Take 50 mg by mouth every 6 (six) hours as needed for Pain.      NON FORMULARY Anti rejection med for kidney transplant

## 2025-01-19 NOTE — OPERATIVE REPORT
Vascular Surgery Op Note  Patients Name:    Christian Hernandez    Operating Physician: Dakota Gómez MD  CSN:    [id number]                                                           Location:  OR  MRN:     [id number]                                            YOB: 1985    Operation Date:     01/18/2025         Pre-Operative Diagnosis: Dissection of thoracoabdominal aorta     Post-Operative Diagnosis: Dissection of thoracoabdominal aorta     Procedure Performed:   1. US guided access of bilateral common femoral arteries.  2. Thoracic and abdominal aortogram with radiologic supervision and interpretation  3. Intravascular ultrasound of right external iliac, right common iliac, left external iliac, left common iliac and aorta with radiologic supervision and interpretation  4. Thoracic endovascular aortic repair without coverage of subclavian with 34mm Zdeg proximally, 34mm Zdeg distally with 36mm Zdes.  5. Angioplasty and stenting of aorta, right common iliac and left common iliac arteries with 16mm Zilver vena stents.  6. Angioplasty and stenting of right external iliac with 12mm Zilver vena proximally and 10mm Viabahan distally post dilated with 9mm balloon.  7. Angioplasty and stenting of left external iliac with 12mm zilver vena proximally and 8mm ev3 distally post dilated with 9mm balloon.  8. Closure of bilateral groin with proglide perclose suture (20Fr right, 8Fr left).     Co-Surgeons:   MD Contsantin Mai MD  Due to the complexity of the operation given the complicated nature of the dissection with malperfusion, I requested my partner to participate as a co-surgeon for this operation.    Surgical Findings: Dissection of thoracoabdominal aorta with severe compression and malperfusion to the lower extremities. Endovascular repair performed with wide patency at completion. Palpable DP at completion.     Specimen: None     Estimated Blood Loss:  200cc      Anesthesia:   General        EBL: 200cc     Complications: None    Drains: None       Indications for procedure: 39-year-old male who presented with significant lower abdominal pain/back pain with lower extremity pain of several days duration.  It significantly worsened earlier in the day today and underwent CTA that revealed type B dissection with thrombus and malperfusion.  He was recommended for emergent repair due to the risk for limb loss.  I discussed the risk benefits of the procedure not limited to the risk of bleeding, infection, rupture, stroke, MI, limb ischemia, stroke, death.  Informed consent was directly obtained.    On the evening of January 18 the patient was brought to the operating room and placed in supine position.  General anesthesia was induced without any issues.  On arterial line was placed.  The patient subsequently prepped and draped in the usual sterile fashion.  Time was performed.  With the use the ultrasound I accessed the distalmost common femoral with advancement of a microwire and exchanged for a micro sheath.  A Glidewire bandage was then put in place followed by 6 [place]ander sheath.    Dr. Frias similarly accessed the distal left common femoral with placement of a 6 [place] sheath.  We then deployed 2 Pro-glide Perclose sutures in the 10 and 2 o'clock position called at placement of an 8 [place] sheath.  The patient was systemically heparinized.  I performed this on the right.  He performed this on the left.  We then advanced intravascular ultrasound through the right external iliac artery, right common iliac artery and aorta and confirmed placement into the true lumen all the way to the aortic arch and then I exchanged for a Lunderquist wire.    Similarly he advanced the intravascular sound to the left external iliac, left common iliac and aorta.  It appeared he went from the true lumen to the false lumen.  With the use of the intravascular ultrasound and Glidewire advantage he was eventually able to negotiate  the wire into the true lumen and placed this into the aortic arch followed by exchanged for a pigtail catheter.      I then exchanged for a 34 mm Cook dissection graft with a 20 Georgian sheath up into the thoracic aorta and put it in the aortic arch.  An aortogram was performed that identified the location of the great vessels.  I then deployed the 34 mm dissection graft at the level of the distal subclavian and extended this into the descending thoracic aorta without any issues.  We then exchanged for a distal extension and Dr. Frias deployed this in the distal descending thoracic aorta just above the level of the celiac artery without any issues.  We then exchanged for a 36 mm Cook bare-metal dissection graft and placed this with appropriate overlap and I deployed this throughout the thoracic aorta to the aortic bifurcation without any issues.  Then Dr. Frias utilized a coda balloon and as he advanced this to the proximal graft, I inflated the balloon to the proximal distal and overlapping segments.  The septum was ballooned thereby rupturing the septum until full graft wall apposition was obtained.  There was several areas that we confirmed on intravascular ultrasound that needed additional ballooning however this was performed with dramatic improvement of flow into the lumen.    We then proceeded with the iliac stenting due to the severe extension of the dissection flap distally.  I advanced a 16 mm Zilver Vena stent into the infrarenal aorta and then he advanced an additional 16 mm Zilver Vena stent in place.  We then deployed in kissing fashion in the infrarenal aorta just above the bifurcation within the bare-metal dissection graft and deployed this without any issues.  I then extended this with a 12 millimeters Zilver Vena stent into the distal external iliac on the right.  He then deployed a 12 mm Zilver Vena stent in the distal external iliac on the left.  We then performed kissing angioplasty with 9 mm  balloons throughout the entirety of this.  Repeat angiogram revealed a focal area of outpouching in the right distal external iliac that was reinforced with a 10 mm Viabahn.  Intravascular ultrasound was advanced once again through each iliac system and confirmed some residual waist in the distal aortic bifurcation and this was remedied with kissing 12 mm balloons.  Repeat intravascular ultrasound revealed excellent wall apposition with no further opacification of the false lumen throughout on the right.  On the left there was residual flap in the distal left external iliac which was resolved with an 8 mm EV 3 stent that Dr. Frias deployed without any issues and postdilated this with 9 mm balloon.  Repeat aortogram revealed wide patency of the great vessels with only faint trace opacification of the false lumen proximally with excellent wall apposition throughout otherwise and flow through the celiac and SMA.  The renals were chronically occluded and atretic due to his end-stage renal disease.  The iliacs were widely patent with preservation of flow to both internal iliac arteries as well.  Retrograde angiograms revealed excellent flow through the lower extremities with no stenosis.  At this point we were satisfied and opted to terminate the procedure.    All catheters and wires were withdrawn.  We then since the Pro-glide Perclose sutures which were cinched locked and cut thereby closing the arteriotomy site.  Manual pressure was held.  Protamine was administered for reversal of heparinization.  Upon completion there was evidence of excellent hemostasis and the skin was closed with 3-0 Vicryl suture and with placement of Dermabond.  Sterile dressings were placed.  The patient awoke from general anesthesia was extubated and taken to the ICU in stable condition.  All counts were correct at the end the case.  At completion he was noted to have palpable pulses in the lower extremities and was found to be  neurovascularly intact.       Dakota Gómez MD

## 2025-01-19 NOTE — BRIEF OP NOTE
Christian [place]  [id number]  6/29/1985    Pre-Operative Diagnosis: Dissection of thoracoabdominal aorta     Post-Operative Diagnosis: Dissection of thoracoabdominal aorta     Procedure Performed:   1. US guided access of bilateral common femoral arteries.  2. Thoracic and abdominal aortogram with radiologic supervision and interpretation  3. Intravascular ultrasound of right external iliac, right common iliac, left external iliac, left common iliac and aorta with radiologic supervision and interpretation  4. Thoracic endovascular aortic repair without coverage of subclavian with 34mm Zdeg proximally, 34mm Zdeg distally with 36mm Zdes.  5. Angioplasty and stenting of aorta, right common iliac and left common iliac arteries with 16mm Zilver vena stents.  6. Angioplasty and stenting of right external iliac with 12mm Zilver vena proximally and 10mm Viabahan distally post dilated with 9mm balloon.  7. Angioplasty and stenting of left external iliac with 12mm zilver vena proximally and 8mm ev3 distally post dilated with 9mm balloon.  8. Closure of bilateral groin with proglide perclose suture (20Fr right, 8Fr left).    Co-Surgeons:   MD Constantin Mai MD     Surgical Findings: Dissection of thoracoabdominal aorta with severe compression and malperfusion to the lower extremities. Endovascular repair performed with wide patency at completion. Palpable DP at completion.     Specimen: None     Estimated Blood Loss:  200cc       Dakota Gómez MD  1/19/2025  12:05 AM

## 2025-01-19 NOTE — OPERATIVE REPORT
Vascular Surgery Op Note  Patients Name:    Christian Hernandez    Operating Physician: Dakota Gómez MD  CSN:    [id number]                                                           Location:  OR  MRN:     [id number]                                            YOB: 1985    Operation Date:     01/18/2025         Pre-Operative Diagnosis: Dissection of thoracoabdominal aorta     Post-Operative Diagnosis: Dissection of thoracoabdominal aorta     Procedure Performed:   1. US guided access of bilateral common femoral arteries.  2. Thoracic and abdominal aortogram with radiologic supervision and interpretation  3. Intravascular ultrasound of right external iliac, right common iliac, left external iliac, left common iliac and aorta with radiologic supervision and interpretation  4. Thoracic endovascular aortic repair without coverage of subclavian with 34mm Zdeg proximally, 34mm Zdeg distally with 36mm Zdes.  5. Angioplasty and stenting of aorta, right common iliac and left common iliac arteries with 16mm Zilver vena stents.  6. Angioplasty and stenting of right external iliac with 12mm Zilver vena proximally and 10mm Viabahan distally post dilated with 9mm balloon.  7. Angioplasty and stenting of left external iliac with 12mm zilver vena proximally and 8mm ev3 distally post dilated with 9mm balloon.  8. Closure of bilateral groin with proglide perclose suture (20Fr right, 8Fr left).     Co-Surgeons:   MD Constantin Mai MD  Due to the complexity of the operation given the complicated nature of the dissection with malperfusion, I requested my partner to participate as a co-surgeon for this operation.    Surgical Findings: Dissection of thoracoabdominal aorta with severe compression and malperfusion to the lower extremities. Endovascular repair performed with wide patency at completion. Palpable DP at completion.     Specimen: None     Estimated Blood Loss:  200cc      Anesthesia:   General        EBL: 200cc     Complications: None    Drains: None       Indications for procedure: 39-year-old male who presented with significant lower abdominal pain/back pain with lower extremity pain of several days duration.  It significantly worsened earlier in the day today and underwent CTA that revealed type B dissection with thrombus and malperfusion.  He was recommended for emergent repair due to the risk for limb loss.  I discussed the risk benefits of the procedure not limited to the risk of bleeding, infection, rupture, stroke, MI, limb ischemia, stroke, death.  Informed consent was directly obtained.    On the evening of January 18 the patient was brought to the operating room and placed in supine position.  General anesthesia was induced without any issues.  On arterial line was placed.  The patient subsequently prepped and draped in the usual sterile fashion.  Time was performed.  With the use the ultrasound I accessed the distalmost common femoral with advancement of a microwire and exchanged for a micro sheath.  A Glidewire bandage was then put in place followed by 6 Kenyan sheath.    Dr. Frias similarly accessed the distal left common femoral with placement of a 6 Kenyan sheath.  We then deployed 2 Pro-glide Perclose sutures in the 10 and 2 o'clock position called at placement of an 8 Kenyan sheath.  The patient was systemically heparinized.  I performed this on the right.  He performed this on the left.  We then advanced intravascular ultrasound through the right external iliac artery, right common iliac artery and aorta and confirmed placement into the true lumen all the way to the aortic arch and then I exchanged for a Lunderquist wire.    Similarly he advanced the intravascular sound to the left external iliac, left common iliac and aorta.  It appeared he went from the true lumen to the false lumen.  With the use of the intravascular ultrasound and Glidewire advantage he was eventually able to negotiate  the wire into the true lumen and placed this into the aortic arch followed by exchanged for a pigtail catheter.      I then exchanged for a 34 mm Cook dissection graft with a 20 Wallisian sheath up into the thoracic aorta and put it in the aortic arch.  An aortogram was performed that identified the location of the great vessels.  I then deployed the 34 mm dissection graft at the level of the distal subclavian and extended this into the descending thoracic aorta without any issues.  We then exchanged for a distal extension and Dr. Frias deployed this in the distal descending thoracic aorta just above the level of the celiac artery without any issues.  We then exchanged for a 36 mm Cook bare-metal dissection graft and placed this with appropriate overlap and I deployed this throughout the thoracic aorta to the aortic bifurcation without any issues.  Then Dr. Frias utilized a coda balloon and as he advanced this to the proximal graft, I inflated the balloon to the proximal distal and overlapping segments.  The septum was ballooned thereby rupturing the septum until full graft wall apposition was obtained.  There was several areas that we confirmed on intravascular ultrasound that needed additional ballooning however this was performed with dramatic improvement of flow into the lumen.    We then proceeded with the iliac stenting due to the severe extension of the dissection flap distally.  I advanced a 16 mm Zilver Vena stent into the infrarenal aorta and then he advanced an additional 16 mm Zilver Vena stent in place.  We then deployed in kissing fashion in the infrarenal aorta just above the bifurcation within the bare-metal dissection graft and deployed this without any issues.  I then extended this with a 12 millimeters Zilver Vena stent into the distal external iliac on the right.  He then deployed a 12 mm Zilver Vena stent in the distal external iliac on the left.  We then performed kissing angioplasty with 9 mm  balloons throughout the entirety of this.  Repeat angiogram revealed a focal area of outpouching in the right distal external iliac that was reinforced with a 10 mm Viabahn.  Intravascular ultrasound was advanced once again through each iliac system and confirmed some residual waist in the distal aortic bifurcation and this was remedied with kissing 12 mm balloons.  Repeat intravascular ultrasound revealed excellent wall apposition with no further opacification of the false lumen throughout on the right.  On the left there was residual flap in the distal left external iliac which was resolved with an 8 mm EV 3 stent that Dr. Frias deployed without any issues and postdilated this with 9 mm balloon.  Repeat aortogram revealed wide patency of the great vessels with only faint trace opacification of the false lumen proximally with excellent wall apposition throughout otherwise and flow through the celiac and SMA.  The renals were chronically occluded and atretic due to his end-stage renal disease.  The iliacs were widely patent with preservation of flow to both internal iliac arteries as well.  Retrograde angiograms revealed excellent flow through the lower extremities with no stenosis.  At this point we were satisfied and opted to terminate the procedure.    All catheters and wires were withdrawn.  We then since the Pro-glide Perclose sutures which were cinched locked and cut thereby closing the arteriotomy site.  Manual pressure was held.  Protamine was administered for reversal of heparinization.  Upon completion there was evidence of excellent hemostasis and the skin was closed with 3-0 Vicryl suture and with placement of Dermabond.  Sterile dressings were placed.  The patient awoke from general anesthesia was extubated and taken to the ICU in stable condition.  All counts were correct at the end the case.  At completion he was noted to have palpable pulses in the lower extremities and was found to be  neurovascularly intact.       Dakota Gómez MD

## 2025-01-19 NOTE — CONSULTS
[facility]  Report of Consultation    Christian Hernandez Patient Status:  Inpatient    1985 MRN [id number]   Location [facility] 4SW-A Attending Dakota Gómez MD   Hosp Day # 0 PCP OREN GALAVIZ MD       Assessment / Plan:    1) ESRD- failed kidney transplant; lytes / volume OK. HD today (off schedule) for hyperkalemia    2) Descending aortic dissection with thrombus -> endovascular repair / PTA + stenting AO / bilat iliacs overnight by Dr. Gómez    3) s/p DDKT  with Ab mediated rejection  -> PLEX + IVIG followed by acute cellular rejection  -> ESRD    4) Longstanding HTN    5) Anemia- due to CKD / post-op; EPO with HD    6) Seizure disorder      Reason for Consultation:  ESRD    History of Present Illness:  Christian Hernandez is a a(n) 39 year old male.  39-year-old male with a history of end-stage renal disease presumably due to longstanding hypertension or glomerulonephritis undergoing  donor kidney transplant 2017 complicated by antibody mediated rejection followed by acute cellular rejection has been on dialysis over the past 1 to 2 years presents to [facility] with abdominal pain found to have a large descending aortic dissection with aortic thrombus.  Underwent complex endovascular repair with angioplasty and stenting of the aorta and bilateral iliacs overnight by Dr. Gómez.  Last dialysis on Friday.      History:  Past Medical History:    Essential hypertension    Seizure disorder (HCC)    Stroke (HCC)     Past Surgical History:   Procedure Laterality Date    Transplantation of kidney  2017     No family history on file.  Denies family history of kidney disease.    reports that he has never smoked. He has never used smokeless tobacco.    Allergies:  Allergies[1]    Medications:    Current Facility-Administered Medications:     prochlorperazine (Compazine) 10 MG/2ML injection 5 mg, 5 mg, Intravenous, Q8H PRN    HYDROmorphone (Dilaudid) 1 MG/ML  injection 0.2 mg, 0.2 mg, Intravenous, Q2H PRN **OR** HYDROmorphone (Dilaudid) 1 MG/ML injection 0.4 mg, 0.4 mg, Intravenous, Q2H PRN **OR** HYDROmorphone (Dilaudid) 1 MG/ML injection 0.8 mg, 0.8 mg, Intravenous, Q2H PRN    lamoTRIgine (LaMICtal) tab 150 mg, 150 mg, Oral, BID    niCARdipine in sodium chloride 0.86% (carDENE) 20 mg/200mL infusion premix, 5-15 mg/hr, Intravenous, Continuous    hydrALAzine (Apresoline) 20 mg/mL injection 10 mg, 10 mg, Intravenous, Q4H PRN    melatonin tab 3 mg, 3 mg, Oral, Nightly PRN    acetaminophen (Tylenol Extra Strength) tab 500 mg, 500 mg, Oral, Q4H PRN    polyethylene glycol (PEG 3350) (Miralax) 17 g oral packet 17 g, 17 g, Oral, Daily PRN    sennosides (Senokot) tab 17.2 mg, 17.2 mg, Oral, Nightly PRN    bisacodyl (Dulcolax) 10 MG rectal suppository 10 mg, 10 mg, Rectal, Daily PRN    ondansetron (Zofran) 4 MG/2ML injection 4 mg, 4 mg, Intravenous, Q6H PRN  No current outpatient medications on file.       Review of Systems:  Please see HPI for pertinent positives. 10 point review of systems otherwise reviewed and negative.     Physical Exam:  /75   Pulse 89   Temp 98.8 °F (37.1 °C) (Temporal)   Resp 13   Wt 179 lb 14.3 oz (81.6 kg)   SpO2 93%   BMI 24.40 kg/m²   Temp (24hrs), Av.1 °F (36.7 °C), Min:96.7 °F (35.9 °C), Max:99 °F (37.2 °C)     No intake or output data in the 24 hours ending 25 0841  Wt Readings from Last 3 Encounters:   25 179 lb 14.3 oz (81.6 kg)   25 201 lb 15.1 oz (91.6 kg)   19 214 lb (97.1 kg)     General: awake pretty alert  HEENT: No scleral icterus, MMM  Neck: Supple, no CJ or thyromegaly  Cardiac: Regular rate and rhythm, S1, S2 normal, no murmur, rub, or gallop  Lungs: Decreased breath sounds at the bases bilaterally.   Abdomen: Soft, non-tender. + bowel sounds, no palpable organomegaly  Extremities: Without clubbing, cyanosis; no edema  Neurologic: Cranial nerves grossly intact, moving all extremities  Skin: Warm  and dry, no rashes      Laboratory Data:  Lab Results   Component Value Date    WBC 11.1 01/19/2025    HGB 8.6 01/19/2025    HCT 26.5 01/19/2025    .0 01/19/2025    CREATSERUM 10.96 01/19/2025    BUN 51 01/19/2025     01/19/2025    K 5.8 01/19/2025     01/19/2025    CO2 26.0 01/19/2025     01/19/2025    CA 8.2 01/19/2025    ALB 3.9 01/19/2025    ALKPHO 68 01/19/2025    BILT 0.2 01/19/2025    TP 6.8 01/19/2025    AST 11 01/19/2025    ALT <7 01/19/2025    INR 1.28 01/19/2025    PTP 16.1 01/19/2025    MG 2.2 01/19/2025    PHOS 7.0 01/19/2025       Imaging:  All imaging studies reviewed.      Thank you for allowing me to participate in the care of your patient.    John Madera MD  1/19/2025  8:41 AM         [1] No Known Allergies

## 2025-01-19 NOTE — ANESTHESIA PROCEDURE NOTES
Airway  Date/Time: 1/18/2025 9:16 PM  Urgency: Elective      General Information and Staff    Patient location during procedure: OR  Anesthesiologist: Tonny Flor DO  Performed: anesthesiologist   Performed by: Tonny Flor DO  Authorized by: Tonny Flor DO      Indications and Patient Condition  Indications for airway management: anesthesia  Sedation level: deep  Preoxygenated: yes  Patient position: sniffing  Mask difficulty assessment: 1 - vent by mask    Final Airway Details  Final airway type: endotracheal airway      Successful airway: ETT  Cuffed: yes   Successful intubation technique: Video laryngoscopy  Endotracheal tube insertion site: oral  ETT size (mm): 7.5    Cormack-Lehane Classification: grade I - full view of glottis  Placement verified by: capnometry   Measured from: lips  ETT to lips (cm): 22  Number of attempts at approach: 1

## 2025-01-20 PROBLEM — J96.01 ACUTE HYPOXIC RESPIRATORY FAILURE (HCC): Status: ACTIVE | Noted: 2025-01-20

## 2025-01-20 PROBLEM — D63.1 ANEMIA IN ESRD (END-STAGE RENAL DISEASE) (HCC): Status: ACTIVE | Noted: 2025-01-19

## 2025-01-20 PROBLEM — N18.6 ANEMIA IN ESRD (END-STAGE RENAL DISEASE) (HCC): Status: ACTIVE | Noted: 2025-01-19

## 2025-01-20 LAB
ADENOVIRUS PCR:: NOT DETECTED
ALBUMIN SERPL-MCNC: 3.3 G/DL (ref 3.2–4.8)
ALBUMIN/GLOB SERPL: 1.1 {RATIO} (ref 1–2)
ALP LIVER SERPL-CCNC: 58 U/L
ALT SERPL-CCNC: <7 U/L
ANION GAP SERPL CALC-SCNC: 6 MMOL/L (ref 0–18)
AST SERPL-CCNC: 11 U/L (ref ?–34)
B PARAPERT DNA SPEC QL NAA+PROBE: NOT DETECTED
B PERT DNA SPEC QL NAA+PROBE: NOT DETECTED
BASOPHILS # BLD AUTO: 0.03 X10(3) UL (ref 0–0.2)
BASOPHILS NFR BLD AUTO: 0.2 %
BILIRUB SERPL-MCNC: <0.2 MG/DL (ref 0.3–1.2)
BUN BLD-MCNC: 30 MG/DL (ref 9–23)
C PNEUM DNA SPEC QL NAA+PROBE: NOT DETECTED
CALCIUM BLD-MCNC: 8.5 MG/DL (ref 8.7–10.6)
CHLORIDE SERPL-SCNC: 101 MMOL/L (ref 98–112)
CO2 SERPL-SCNC: 27 MMOL/L (ref 21–32)
CORONAVIRUS 229E PCR:: NOT DETECTED
CORONAVIRUS HKU1 PCR:: NOT DETECTED
CORONAVIRUS NL63 PCR:: NOT DETECTED
CORONAVIRUS OC43 PCR:: NOT DETECTED
CREAT BLD-MCNC: 7.94 MG/DL
EGFRCR SERPLBLD CKD-EPI 2021: 8 ML/MIN/1.73M2 (ref 60–?)
EOSINOPHIL # BLD AUTO: 0.01 X10(3) UL (ref 0–0.7)
EOSINOPHIL NFR BLD AUTO: 0.1 %
ERYTHROCYTE [DISTWIDTH] IN BLOOD BY AUTOMATED COUNT: 16.2 %
ERYTHROCYTE [DISTWIDTH] IN BLOOD BY AUTOMATED COUNT: 16.3 %
FLUAV RNA SPEC QL NAA+PROBE: NOT DETECTED
FLUBV RNA SPEC QL NAA+PROBE: NOT DETECTED
GLOBULIN PLAS-MCNC: 3.1 G/DL (ref 2–3.5)
GLUCOSE BLD-MCNC: 107 MG/DL (ref 70–99)
HCT VFR BLD AUTO: 16.3 %
HCT VFR BLD AUTO: 23.5 %
HGB BLD-MCNC: 5.5 G/DL
HGB BLD-MCNC: 7.8 G/DL
IMM GRANULOCYTES # BLD AUTO: 0.12 X10(3) UL (ref 0–1)
IMM GRANULOCYTES NFR BLD: 0.6 %
LACTATE SERPL-SCNC: 0.9 MMOL/L (ref 0.5–2)
LYMPHOCYTES # BLD AUTO: 0.69 X10(3) UL (ref 1–4)
LYMPHOCYTES NFR BLD AUTO: 3.5 %
MAGNESIUM SERPL-MCNC: 2 MG/DL (ref 1.6–2.6)
MCH RBC QN AUTO: 30.7 PG (ref 26–34)
MCH RBC QN AUTO: 31.4 PG (ref 26–34)
MCHC RBC AUTO-ENTMCNC: 33.2 G/DL (ref 31–37)
MCHC RBC AUTO-ENTMCNC: 33.7 G/DL (ref 31–37)
MCV RBC AUTO: 92.5 FL
MCV RBC AUTO: 93.1 FL
METAPNEUMOVIRUS PCR:: NOT DETECTED
MONOCYTES # BLD AUTO: 4.06 X10(3) UL (ref 0.1–1)
MONOCYTES NFR BLD AUTO: 20.7 %
MYCOPLASMA PNEUMONIA PCR:: NOT DETECTED
NEUTROPHILS # BLD AUTO: 14.71 X10 (3) UL (ref 1.5–7.7)
NEUTROPHILS # BLD AUTO: 14.71 X10(3) UL (ref 1.5–7.7)
NEUTROPHILS NFR BLD AUTO: 74.9 %
OSMOLALITY SERPL CALC.SUM OF ELEC: 285 MOSM/KG (ref 275–295)
PARAINFLUENZA 1 PCR:: NOT DETECTED
PARAINFLUENZA 2 PCR:: NOT DETECTED
PARAINFLUENZA 3 PCR:: NOT DETECTED
PARAINFLUENZA 4 PCR:: NOT DETECTED
PHOSPHATE SERPL-MCNC: 6.1 MG/DL (ref 2.4–5.1)
PLATELET # BLD AUTO: 154 10(3)UL (ref 150–450)
PLATELET # BLD AUTO: 167 10(3)UL (ref 150–450)
PLATELETS.RETICULATED NFR BLD AUTO: 5.1 % (ref 0–7)
POTASSIUM SERPL-SCNC: 4.9 MMOL/L (ref 3.5–5.1)
PROCALCITONIN SERPL-MCNC: 8.88 NG/ML (ref ?–0.05)
PROT SERPL-MCNC: 6.4 G/DL (ref 5.7–8.2)
RBC # BLD AUTO: 1.75 X10(6)UL
RBC # BLD AUTO: 2.54 X10(6)UL
RHINOVIRUS/ENTERO PCR:: NOT DETECTED
RSV RNA SPEC QL NAA+PROBE: NOT DETECTED
SARS-COV-2 RNA NPH QL NAA+NON-PROBE: NOT DETECTED
SODIUM SERPL-SCNC: 134 MMOL/L (ref 136–145)
WBC # BLD AUTO: 17.2 X10(3) UL (ref 4–11)
WBC # BLD AUTO: 19.6 X10(3) UL (ref 4–11)

## 2025-01-20 PROCEDURE — 99233 SBSQ HOSP IP/OBS HIGH 50: CPT | Performed by: INTERNAL MEDICINE

## 2025-01-20 PROCEDURE — 99291 CRITICAL CARE FIRST HOUR: CPT | Performed by: INTERNAL MEDICINE

## 2025-01-20 RX ORDER — ACETAMINOPHEN 10 MG/ML
1000 INJECTION, SOLUTION INTRAVENOUS EVERY 6 HOURS PRN
Status: DISCONTINUED | OUTPATIENT
Start: 2025-01-20 | End: 2025-01-25

## 2025-01-20 RX ORDER — ACETAMINOPHEN 500 MG
1000 TABLET ORAL EVERY 6 HOURS PRN
Status: DISCONTINUED | OUTPATIENT
Start: 2025-01-20 | End: 2025-01-25

## 2025-01-20 RX ORDER — TRAMADOL HYDROCHLORIDE 50 MG/1
50 TABLET ORAL EVERY 12 HOURS PRN
Status: DISCONTINUED | OUTPATIENT
Start: 2025-01-20 | End: 2025-01-20

## 2025-01-20 RX ORDER — CARVEDILOL 12.5 MG/1
25 TABLET ORAL 2 TIMES DAILY WITH MEALS
Status: DISCONTINUED | OUTPATIENT
Start: 2025-01-20 | End: 2025-01-25

## 2025-01-20 RX ORDER — ALBUMIN (HUMAN) 12.5 G/50ML
25 SOLUTION INTRAVENOUS
Status: DISCONTINUED | OUTPATIENT
Start: 2025-01-20 | End: 2025-01-21

## 2025-01-20 RX ORDER — TRAMADOL HYDROCHLORIDE 50 MG/1
50 TABLET ORAL EVERY 12 HOURS PRN
Status: DISCONTINUED | OUTPATIENT
Start: 2025-01-20 | End: 2025-01-20 | Stop reason: SDUPTHER

## 2025-01-20 NOTE — PLAN OF CARE
Received in bed this morning alert and oriented x 4, Nicardipine drip ongoing at Max rate for BP control, BP maintained at 140 to 180 systolic, BP in A line is higher with 40 points than BP cuff, Dr. Knapp advised to get the mean for BP systolic and MAP from both pressures then give the Hydralazine if above 180 systolic. Both groin sites are soft, no bleeding nor swelling noted. Started on Amlodipine PO for BP control. BP maintained below 180 systolic. Seen by renal this morning and ordered HD, Dr. Knapp talked to Dr. Gómez about high blood pressure and max of Nicardipine drip, will do permissive hypertension for perfusion per Dr. Gómez and Dr. Knapp. Patient initially refused HD but Dr. Brewer was able to convince him to have it done, HD done for 3 hours, 1 L of fluids pulled out. Patient is asking for pain medicine at least every  2.5 hours for generalized pain and left and right groin with minimal response,  aware of pain. Patient can get out of bed and ambulate after CT scan per Dr. Gómez. CT scan for abdomen, chest and pelvis ordered and done by NOC shift after report. Family came and patient updated them of POC.

## 2025-01-20 NOTE — PROGRESS NOTES
[facility]   part of [facility]     Hospitalist Progress Note     Christian Hernandez Patient Status:  Inpatient    1985 MRN [id number]   Location [facility] 4SW-A Attending Dakota Gómez MD   Hosp Day # 1 PCP OREN GALAVIZ MD     Chief Complaint: Aortic dissection    Subjective:     Patient resting     Objective:    Review of Systems:   A comprehensive review of systems was completed; pertinent positive and negatives stated in subjective.    Vital signs:  Temp:  [99.1 °F (37.3 °C)-102.2 °F (39 °C)] 100.8 °F (38.2 °C)  Pulse:  [] 92  Resp:  [13-30] 16  BP: (121-177)/(53-83) 147/70  SpO2:  [88 %-100 %] 90 %  AO: (126-196)/(65-86) 173/71    Physical Exam:    General: No acute distress  Respiratory: No wheezes, no rhonchi  Cardiovascular: S1, S2, regular rate and rhythm  Abdomen: Soft, Non-tender, non-distended, positive bowel sounds  Neuro: No new focal deficits.   Extremities: No edema      Diagnostic Data:    Labs:  Recent Labs   Lab 25  17025  0416 25  0444 25  0534   WBC 9.5 11.1* 19.6* 17.2*   HGB 9.0* 8.6* 5.5* 7.8*   MCV 91.3 93.3 93.1 92.5   .0 172.0 167.0 154.0   INR  --  1.28*  --   --        Recent Labs   Lab 25  17025  0416 25  0444   * 110* 107*   BUN 48* 51* 30*   CREATSERUM 10.37* 10.96* 7.94*   CA 8.6* 8.2* 8.5*   ALB 3.9 3.9 3.3    137 134*   K 5.1 5.8* 4.9    100 101   CO2 29.0 26.0 27.0   ALKPHO 73 68 58   AST 10 11 11   ALT 7* <7* <7*   BILT 0.3 0.2* <0.2*   TP 7.0 6.8 6.4       Estimated Glomerular Filtration Rate: 8.3 mL/min/1.73m2 (A) (by CKD-EPI based on SCr of 7.94 mg/dL (H)).    Recent Labs   Lab 25  1709   TROPHS 50          Recent Labs   Lab 25  0416   PTP 16.1*   INR 1.28*                  Microbiology    No results found for this visit on 25.      Imaging: Reviewed in Epic.    Medications:    ampicillin-sulbactam  1.5 g Intravenous q12h    carvedilol  25  mg Oral BID with meals    lamoTRIgine  150 mg Oral BID    amLODIPine  10 mg Oral Daily       Assessment & Plan:      #Descending Type B aortic dissection with severe compression/malperfusion of BLLE  -s/p thoracic endovascular aortic repair, angioplasty/stenting of aorta and R/L common iliac aa., R/L external iliac aa.  on 1/19   -management per Vascular surgery   -SBP goal 140-180, cardene gtt    #Acute hypoxic respiratory failure  -volume management with HD  -wean supplemental O2 as tolerated   -Nephrology and pulmonology on consult     #Sepsis  -source undetermined, empiric antibiotics  -Blood Cultures      #Acute anemia 2/2 blood loss on chronic anemia due to ESRD  -expected and related to surgery, no signs or sx of significant active bleeding   -monitor and transfuse PRBC to maintain Hb >7    #ESRD with history of failed renal transplant  -nephrology consult  -HD per nephro     #Hx of CVA  #Cerebrovascular disease  #Seizure disorder  -lamotrigine      Matthias Alfonso,     Supplementary Documentation:     Quality:  DVT Mechanical Prophylaxis:   SCDs, Early ambuation  DVT Pharmacologic Prophylaxis   Medication    heparin (Porcine) 1000 UNIT/ML injection 1,500 Units                Code Status: Full Code  Rangel: No urinary catheter in place      The 21st Century Cures Act makes medical notes like these available to patients in the interest of transparency. Please be advised this is a medical document. Medical documents are intended to carry relevant information, facts as evident, and the clinical opinion of the practitioner. The medical note is intended as peer to peer communication and may appear blunt or direct. It is written in medical language and may contain abbreviations or verbiage that are unfamiliar.

## 2025-01-20 NOTE — PLAN OF CARE
Pt received following report on Nicardipine gtt. A&O x 4. On 5L nasal cannula. NSR. A-line intact, SBP goal of 140-180. Febrile, Tmax 102.4 (tylenol given see MAR). Bcx & flu panel sent. Pain managed throughout shift. CT chest/abd/pelvis completed. Patient updated on plan of care. See MAR and flowsheets for more.

## 2025-01-20 NOTE — PROGRESS NOTES
[facility]  Pulmonary/Critical Care Progress note    Christian Hernandez Patient Status:  Inpatient    1985 MRN [id number]   Location [facility] 4SW-A Attending Dakota Gómez MD   Hosp Day # 1 PCP OREN GALAVIZ MD       History of Present Illness:  Sitting up in chair. States that he is breathing okay. Admits to mild abdominal pains. Requiring supplemental oxygen 5 L/min by nasal cannula but oxygen saturations are 100%. Overnight had temperature of 39 °C and received Tylenol, currently temperature is 100.8 °F. Blood cultures have been collected. Still requiring nicardipine drip. Oral amlodipine has been initiated       History:  Past Medical History:    Essential hypertension    Seizure disorder (HCC)    Stroke (HCC)     Past Surgical History:   Procedure Laterality Date    Transplantation of kidney  2017     No family history on file.   reports that he has never smoked. He has never used smokeless tobacco.    Allergies:  Allergies[1]    Medications:    Current Facility-Administered Medications:     acetaminophen (Tylenol Extra Strength) tab 1,000 mg, 1,000 mg, Oral, Q6H PRN    prochlorperazine (Compazine) 10 MG/2ML injection 5 mg, 5 mg, Intravenous, Q8H PRN    HYDROmorphone (Dilaudid) 1 MG/ML injection 0.2 mg, 0.2 mg, Intravenous, Q2H PRN **OR** HYDROmorphone (Dilaudid) 1 MG/ML injection 0.4 mg, 0.4 mg, Intravenous, Q2H PRN **OR** HYDROmorphone (Dilaudid) 1 MG/ML injection 0.8 mg, 0.8 mg, Intravenous, Q2H PRN    lamoTRIgine (LaMICtal) tab 150 mg, 150 mg, Oral, BID    niCARdipine in sodium chloride 0.86% (carDENE) 20 mg/200mL infusion premix, 5-15 mg/hr, Intravenous, Continuous    hydrALAzine (Apresoline) 20 mg/mL injection 10 mg, 10 mg, Intravenous, Q4H PRN    melatonin tab 3 mg, 3 mg, Oral, Nightly PRN    polyethylene glycol (PEG 3350) (Miralax) 17 g oral packet 17 g, 17 g, Oral, Daily PRN    sennosides (Senokot) tab 17.2 mg, 17.2 mg, Oral, Nightly PRN    bisacodyl (Dulcolax) 10 MG  rectal suppository 10 mg, 10 mg, Rectal, Daily PRN    ondansetron (Zofran) 4 MG/2ML injection 4 mg, 4 mg, Intravenous, Q6H PRN    amLODIPine (Norvasc) tab 10 mg, 10 mg, Oral, Daily    heparin (Porcine) 1000 UNIT/ML injection 1,500 Units, 1.5 mL, Intracatheter, PRN Dialysis    Intake/Output:    Intake/Output Summary (Last 24 hours) at 1/20/2025 0722  Last data filed at 1/20/2025 0607  Gross per 24 hour   Intake 3836 ml   Output 1000 ml   Net 2836 ml      Body mass index is 25.15 kg/m².    Review of Systems  Review of Systems:   A comprehensive 10 point review of systems was completed.  Pertinent positives and negatives noted in the the HPI.       Patient Vitals for the past 24 hrs:   BP Temp Temp src Pulse Resp SpO2 Weight   01/20/25 0600 133/75 -- -- 96 16 95 % --   01/20/25 0500 139/75 -- -- 93 18 99 % --   01/20/25 0400 146/66 (!) 101.4 °F (38.6 °C) Temporal 102 17 100 % --   01/20/25 0300 159/68 -- -- 103 17 100 % --   01/20/25 0255 -- (!) 102.2 °F (39 °C) -- -- -- -- --   01/20/25 0200 142/72 -- -- 104 14 97 % --   01/20/25 0100 158/78 -- -- 108 17 93 % --   01/20/25 0015 -- (!) 102.1 °F (38.9 °C) Temporal 104 17 94 % 185 lb 6.5 oz (84.1 kg)   01/20/25 0000 155/79 -- -- 102 21 90 % --   01/19/25 2300 150/77 -- -- 105 18 91 % --   01/19/25 2200 (!) 164/79 -- -- 104 16 92 % --   01/19/25 2100 (!) 177/77 -- -- 106 13 91 % --   01/19/25 2000 -- 99.8 °F (37.7 °C) Temporal 95 18 95 % --   01/19/25 1900 154/76 -- -- 96 17 95 % --   01/19/25 1800 (!) 164/77 -- -- 103 26 97 % --   01/19/25 1730 -- -- -- 97 18 93 % --   01/19/25 1700 157/79 -- -- 93 17 91 % --   01/19/25 1630 -- -- -- 94 16 93 % --   01/19/25 1600 157/81 99.8 °F (37.7 °C) Temporal 96 19 93 % --   01/19/25 1530 -- -- -- 95 14 91 % --   01/19/25 1500 151/80 -- -- 96 20 90 % --   01/19/25 1400 147/83 -- -- 96 20 (!) 89 % --   01/19/25 1300 151/79 -- -- 94 16 90 % --   01/19/25 1200 121/53 99.1 °F (37.3 °C) Temporal 92 17 90 % --   01/19/25 1100 145/77 -- --  90 15 91 % --   01/19/25 1000 153/73 -- -- 89 16 92 % --   01/19/25 0900 154/78 -- -- 91 16 91 % --   01/19/25 0800 127/75 98.9 °F (37.2 °C) Temporal 89 13 93 % --   01/19/25 0747 127/75 -- -- 89 15 92 % --   01/19/25 0746 -- 98.8 °F (37.1 °C) Temporal 90 14 93 % --     Vitals:    01/20/25 0300 01/20/25 0400 01/20/25 0500 01/20/25 0600   BP: 159/68 146/66 139/75 133/75   BP Location:  Radial     Pulse: 103 102 93 96   Resp: 17 17 18 16   Temp:  (!) 101.4 °F (38.6 °C)     TempSrc:  Temporal     SpO2: 100% 100% 99% 95%   Weight:             Physical Exam  Constitutional:       General: He is not in acute distress.     Appearance: Normal appearance. He is not ill-appearing or diaphoretic.   HENT:      Head: Normocephalic and atraumatic.      Nose: Nose normal. No congestion or rhinorrhea.      Mouth/Throat:      Mouth: Mucous membranes are moist.      Pharynx: Oropharynx is clear. No oropharyngeal exudate or posterior oropharyngeal erythema.      Comments: Mallampati class IV palate   Eyes:      Extraocular Movements: Extraocular movements intact.      Pupils: Pupils are equal, round, and reactive to light.   Cardiovascular:      Rate and Rhythm: Normal rate.      Pulses: Normal pulses.      Heart sounds: Normal heart sounds. No murmur heard.  Pulmonary:      Effort: Pulmonary effort is normal. No respiratory distress.      Breath sounds: Normal breath sounds. No wheezing or rhonchi.   Chest:      Chest wall: No tenderness.   Abdominal:      Palpations: Abdomen is soft.   Musculoskeletal:         General: Normal range of motion.   Skin:     General: Skin is warm.   Neurological:      General: No focal deficit present.      Mental Status: He is alert and oriented to person, place, and time.   Psychiatric:         Mood and Affect: Mood normal.         Behavior: Behavior normal.         Thought Content: Thought content normal.         Judgment: Judgment normal.            Lab Data Review:  Recent Labs   Lab 01/19/25  6992  01/20/25  0444 01/20/25  0534   WBC 11.1* 19.6* 17.2*   HGB 8.6* 5.5* 7.8*   HCT 26.5* 16.3* 23.5*   .0 167.0 154.0       Recent Labs   Lab 01/18/25  1709 01/19/25  0416 01/20/25  0444    137 134*   K 5.1 5.8* 4.9    100 101   CO2 29.0 26.0 27.0   BUN 48* 51* 30*   CREATSERUM 10.37* 10.96* 7.94*   CA 8.6* 8.2* 8.5*   ALB 3.9 3.9 3.3   ALKPHO 73 68 58   ALT 7* <7* <7*   AST 10 11 11   * 110* 107*       Recent Labs   Lab 01/19/25  0416 01/20/25  0444   MG 2.2 2.0       Lab Results   Component Value Date    PHOS 6.1 (H) 01/20/2025        Recent Labs   Lab 01/18/25 1709 01/19/25 0416   INR  --  1.28*   PTT 30.0  --        No results for input(s): \"ABGPHT\", \"UBQHBF6M\", \"IGTJY4V\", \"ABGHCO3\", \"ABGBE\", \"TEMP\", \"JOSE L\", \"SITE\", \"DEV\", \"THGB\" in the last 168 hours.    No results for input(s): \"TROP\", \"CKMB\" in the last 168 hours.    Cultures: No results found for this visit on 01/19/25.        Radiology personally reviewed:  CTA CHEST CTA ABDOMEN CTA PELVIS (CPT=71275/53237)    Result Date: 1/19/2025  CONCLUSION:   1. Changes related to interval endovascular treatment of previous type B dissection of the aorta. There is persistent partial filling of the false lumen along the proximal thoracic aorta measuring up to 18 mm in thickness which tapers distally.  The stent begins just beyond the origin of the left subclavian artery.  A small portion of the dissection flap persists near the origin of the left vertebral artery which originates directly from the thoracic aorta. There is persistent filling of a portion of the false lumen in the upper abdominal aorta measuring up to 5 mm in thickness beginning near the origin of the celiac artery and extending below the level of the native renal arteries.  There are stents also noted extending into the common iliac arteries and external iliac arteries.  There is a small dissection flap in the right common femoral artery with filling of the false lumen  measuring up to 4 mm in thickness.  2. Scattered areas of atelectasis/consolidation in the lungs, most pronounced in the lower lobes.  Patchy ground-glass opacity and interstitial thickening elsewhere in the lungs.  The overall findings could be due to multifocal pneumonia, with pulmonary edema or other etiologies not entirely excluded.  Clinical correlation recommended along with follow-up until complete resolution.  3. Mildly enlarged paratracheal lymph nodes measuring up to 2.2 x 1.3 cm. There are enlarged right axillary lymph nodes measuring up to 2.3 x 1.5 cm. Enlarged right inguinal lymph nodes measuring up to 1.9 x 1.2 cm.  While these lymph nodes could be reactive, with other etiologies not entirely excluded.  Clinical correlation recommended.  PET-CT may be of further value.  4. Small left pleural effusion.  5. Transplanted right pelvic kidney noted.  There is marked heterogeneity of the transplanted right pelvic kidney with scattered areas of calcification in linear bands of density that is concerning for changes related to rejection.  Clinical correlation recommended.  Please see above for further details.  LOCATION:  [facility]   Dictated by (CST): Cj Lance MD on 1/19/2025 at 8:27 PM     Finalized by (CST): Cj Lance MD on 1/19/2025 at 8:39 PM       CTA CHEST (CPT=71275)    Result Date: 1/18/2025  CONCLUSION:  Markedly suboptimal artifactually degraded examination. Within these parameters: 1. Descending aortic dissection (likely Sarbjit type B, without clear evidence of Sarbjit type A involvement of the ascending thoracic aorta).  2. Aortic thrombus is suggested in the false lumen in the descending thoracic aorta.  3. Suspected severe bilateral pulmonary interstitial edema, with or without concurrent infectious process.  4. Bilateral pleural effusions.  5. No evidence of acute central pulmonary embolism involving the main pulmonary artery branches, but without diagnostic assessment of lobar,  segmental, or subsegmental levels.  6. Dilatation of the main pulmonary artery trunk may relate to underlying pulmonary hypertension.   7. Ascending thoracic aortic ectasia.  8. Mediastinal lymphadenopathy, perhaps reactive.  9. Lesser incidental findings as above.    Results of this examination were discussed with the patient's physician, Dr. Tyler, by Dr. Culp at 1938 (as study was in progress on the CT scanner) on 01/18/2025.   Dictated by (CST): Enzo Culp MD on 1/18/2025 at 7:48 PM     Finalized by (CST): Enzo Culp MD on 1/18/2025 at 7:57 PM          CTA ABDOMEN/PELVIS LOWER EXT BILAT W RUNOFF (JWG=18379)    Result Date: 1/18/2025  CONCLUSION:  1. Aortic dissection throughout the visualized descending thoracic aorta and abdominal aorta; the proximal extent of the dissection is not known (whether this represents a Sarbjit type A or Sarbjit type B dissection). Completion CT angiography of the chest is recommended.  2. Suggestion of extensive pulmonary interstitial edema. This could be acute; correlation for cardiac insufficiency is recommended.  3. The aortic dissection propagates into the celiac axis; the SMA appears to arise from the true lumen.   4. Dissection flap appears to extend into the left renal artery ostium, and left renal arterial flow predominately derived from the false lumen.  5. Dissection flap extending into the right common iliac artery and right internal iliac artery origin; there is occlusive thrombus involving the distal right common femoral artery over 3.9 cm length with distal reconstitution. Distally, there is patency  of the popliteal artery with three-vessel runoff.  6. Extensive dissection extending into the left common iliac artery with occlusive thrombus of the distal common femoral artery involving a 3.5 cm length segment. There is suspected propagation of the dissection into the left superficial femoral and popliteal arteries, with relatively diminished flow in the  left lower extremity. There is thready flow in the runoff vessels.  7. Flow is visualized in the left dorsalis pedis, which appears to have variant anatomy driving from the left peroneal artery.  8. There are bilateral pleural effusions and associated atelectasis, with or without superimposed pneumonia.  9. Suspected rejected right lower quadrant renal allograft.   10. Advanced atrophy of the native kidneys.  11. Low-density appearance of the intracardiac blood pool raises the possibility of underlying anemia. Correlate with hematologic parameters.  12. Heavy stool burden with possible fecal impaction in the rectal vault.  13. Lesser incidental findings as above.     Results of this examination were discussed with the patient's physician, Dr. Pelletier, by Dr. Culp at 1911 on 01/18/2025.   Dictated by (CST): Enzo Culp MD on 1/18/2025 at 7:11 PM     Finalized by (CST): Enzo Culp MD on 1/18/2025 at 7:32 PM            Patient Active Problem List   Diagnosis    Aortic dissection (HCC)    Dissection of aorta, unspecified portion of aorta (HCC)    Primary hypertension    Dissection of aorta, thoracoabdominal (HCC)    Descending thoracic aortic dissection (HCC)    ESRD (end stage renal disease) (HCC)    Anemia due to chronic kidney disease, on chronic dialysis (HCC)     40 y/o with h/o HTN, ESRD on HD and SZR d/o who was admitted to [facility] with c/o LLE pain along with lower back pain. Denies recent MVA/trauma/falls. Has h/o similar pain in the past but not as severe and did not seek medical w/u. In ER, pt underwent w/u including CTA C/A/P which revealed a large descending aortic dissection ([place] type B) along with aortic thrombus and pulmonary edema. Was evaluated by PV surgery and required emergent intervention.     Assessment/plan:      Pulmonary:  Acute hypoxic resp failure suspect due to pulmonary edema with volume overload- extubated in PACU requiring supplemental oxygen 5 L pulmonary by  nasal cannula and oxygen saturations of 100%  Bibasilar atelectasis/infiltrate now with fever Tmax 39 °F      Plan:  NIPPV as needed for increased work of breathing  Wean FiO2 to keep oxygen saturation between 90% to 92%.    Cardiovascular:  HTN emergency - with associated pulmonary edema and dissection of descending aorta  Descending aortic dissection (Sarbjit type B)      Plan:  - s/p endovascular repair, plasty, stenting of aorta, bilateral iliac arteries (1/19)  - PV surgery managing closely  - BP control with Cardene drip  - analgesic support  - tight BP control with goal -180  - cardene gtt prn  -Initiate home amlodipine gradually to optimal dose and resume coreg, hydralazine tid- will resume slowly as BP allows    Hematologic/coagulation  Anemia: Suspect due to CKD and postop on Epogen with hemodialysis    Plan:  Continue to monitor hemoglobin and transfuse PRBC for hemoglobin less than 7 g    Renal/FEN  Hypertension history  ESRD on HD, failed renal transplant 2017      Plan:  F/E/N- minimize IVFs, lytes per protocol, ADAT  - MWF HD; however, pt will need HD today, given volume load, pulm edema and persistent HTN  -Renal consult appreciated     Endocrine/DEM  Blood sugars controlled    Plan:  Monitor    Gastroenterology/hepatology   Tolerating oral renal diet  No BM yestr     Plan:  Continue to monitor symptoms    Infectious disease:  Fever to 39 ° Celsius today    Plan:  Check blood blood cultures ordered  Will check procalcitonin if positive will start IV Unasyn    Neurologic/psychiatry  Neuro- h/o CVA and subsequent seizure disorder clinically stable      Plan:  - no signs of active seizure  - resume lamictal  - monitor for seizures    Musculoskeletal/rheumatology  No evidence of musculoskeletal disease  Pain     Plan:    Add tramadol as needed     Prophylaxis:    DVT prophylaxis: SCD boots   GI prophylaxis: ---    Lines:  PIV, HD line, Arterial Line  Catheters:Anuric   Feeding: renal diet      Disposition:  ICU monitoring    CODE STATUS: Full code            Raza Gomez MD    40  minutes of critical care time were spent at the bedside performing history, physical, complex data interpretation, radiographic interpretation, discussion with consultants, and creating a diagnostic and therapeutic treatment plan for this critically ill patient who is at risk for clinical deterioration. Time spent was excluding time any billable procedures      Note to the patient: The 21st Century Cures Act makes medical notes like these available to patients in the interest of transparency. However, be advised that this is a medical document. It is intended as peer to peer communication. It is written in medical language and may contain abbreviations or verbiage that are unfamiliar. It may appear blunt or direct. Medical documents are intended to carry relevant information, facts as evident, and clinical opinion of the practitioner.      Disclaimer: Components of this note were documented using voice recognition system and are subject to errors not corrected at proofreading. Contact the author of this note for any clarifications          [1] No Known Allergies

## 2025-01-20 NOTE — PLAN OF CARE
Assumed care of pt at the change of shift. Pt a&Ox4, on 4LNC. Complaints of abdominal pain and groin site pain- vascular team aware. NSR on tele. Hypertensive on art line- blood pressure cuffs and art lines not correlating- vascular made aware. Pt up to 200s sbp on art line and cuff pressures were 160s bilaterally. Art line to be taken out and titrate per cuff pressures per vascular. Cardene gtt weaned off- see eMAR. HD today- 2L off. Pt tolerated sitting in chair today. No wounds. Abx started. Febrile 101- MD made aware, see eMAR. Pt updated on plan of care. See flowsheets for further information.

## 2025-01-20 NOTE — OPERATIVE REPORT
Pre-Operative Diagnosis: Dissection of thoracoabdominal aorta     Post-Operative Diagnosis: Dissection of thoracoabdominal aorta     Procedure Performed:   1. US guided access of bilateral common femoral arteries.  2. Thoracic and abdominal aortogram with radiologic supervision and interpretation  3. Intravascular ultrasound of right external iliac, right common iliac, left external iliac, left common iliac and aorta with radiologic supervision and interpretation  4. Thoracic endovascular aortic repair without coverage of subclavian with 34mm Zdeg proximally, 34mm Zdeg distally with 36mm Zdes.  5. Angioplasty and stenting of aorta, right common iliac and left common iliac arteries with 16mm Zilver vena stents.  6. Angioplasty and stenting of right external iliac with 12mm Zilver vena proximally and 10mm Viabahan distally post dilated with 9mm balloon.  7. Angioplasty and stenting of left external iliac with 12mm zilver vena proximally and 8mm ev3 distally post dilated with 9mm balloon.  8. Closure of bilateral groin with proglide perclose suture (20Fr right, 8Fr left).     Co-Surgeons:   MD Constantin Mai MD  Due to the complexity of the operation given the complicated nature of the dissection with malperfusion, Dr. Gómez requested I participate as a co-surgeon for this operation.     Surgical Findings: Dissection of thoracoabdominal aorta with severe compression and malperfusion to the lower extremities. Endovascular repair performed with wide patency at completion. Palpable DP at completion.     Specimen: None     Estimated Blood Loss:  200cc        Anesthesia:   General        EBL: 200cc     Complications: None     Drains: None        Indications for procedure: 39-year-old male who presented with significant lower abdominal pain/back pain with lower extremity pain of several days duration.  It significantly worsened earlier in the day today and underwent CTA that revealed type B dissection  with thrombus and malperfusion.  He was recommended for emergent repair due to the risk for limb loss.  I discussed the risk benefits of the procedure not limited to the risk of bleeding, infection, rupture, stroke, MI, limb ischemia, stroke, death.  Informed consent was directly obtained.     On the evening of January 18 the patient was brought to the operating room and placed in supine position.  General anesthesia was induced without any issues.  On arterial line was placed.  The patient subsequently prepped and draped in the usual sterile fashion.  Time was performed.  With the use the ultrasound Dr. Gómez accessed the distalmost common femoral with advancement of a microwire and exchanged for a micro sheath.  A Glidewire bandage was then put in place followed by 6 Norwegian sheath.     I accessed the distal left common femoral with placement of a 6 Norwegian sheath.  We then deployed 2 Pro-glide Perclose sutures in the 10 and 2 o'clock position called at placement of an 8 Norwegian sheath.  The patient was systemically heparinized.  Dr. Gómez performed this on the right.  I performed this on the left.  We then advanced intravascular ultrasound through the right external iliac artery, right common iliac artery and aorta and confirmed placement into the true lumen all the way to the aortic arch and then Dr. Gómez exchanged for a Lunderquist wire.     I then advanced the intravascular ultrasound to the left external iliac, left common iliac and aorta.  It appeared he went from the true lumen to the false lumen.  With the use of the intravascular ultrasound and Glidewire advantage I was eventually able to negotiate the wire into the true lumen and placed this into the aortic arch followed by exchanged for a pigtail catheter.        Dr. Gómez then exchanged for a 34 mm Cook dissection graft with a 20 Norwegian sheath up into the thoracic aorta and put it in the aortic arch.  An aortogram was performed that  identified the location of the great vessels.  Dr. Gómez then deployed the 34 mm dissection graft at the level of the distal subclavian and extended this into the descending thoracic aorta without any issues.  We then exchanged for a distal extension and I deployed this in the distal descending thoracic aorta just above the level of the celiac artery without any issues.  We then exchanged for a 36 mm Cook bare-metal dissection graft and placed this with appropriate overlap and Dr. Gómez deployed this throughout the thoracic aorta to the aortic bifurcation without any issues.  Then I utilized a coda balloon and as he advanced this to the proximal graft, Dr. Gómez inflated the balloon to the proximal distal and overlapping segments.  The septum was ballooned thereby rupturing the septum until full graft wall apposition was obtained.  There was several areas that we confirmed on intravascular ultrasound that needed additional ballooning however this was performed with dramatic improvement of flow into the lumen.     We then proceeded with iliac stenting due to the severe extension of the dissection flap distally.  Dr. Gómez advanced a 16 mm Zilver Vena stent into the infrarenal aorta and then I advanced an additional 16 mm Zilver Vena stent in place.  We then deployed in kissing fashion in the infrarenal aorta just above the bifurcation within the bare-metal dissection graft and deployed this without any issues.  Dr. Gómez then extended this with a 12 millimeters Zilver Vena stent into the distal external iliac on the right.  I then deployed a 12 mm Zilver Vena stent in the distal external iliac on the left.  We then performed kissing angioplasty with 9 mm balloons throughout the entirety of this.  Repeat angiogram revealed a focal area of outpouching in the right distal external iliac that was reinforced with a 10 mm Viabahn.  Intravascular ultrasound was advanced once again through each iliac system  and confirmed some residual waist in the distal aortic bifurcation and this was remedied with kissing 12 mm balloons.  Repeat intravascular ultrasound revealed excellent wall apposition with no further opacification of the false lumen throughout the right.  On the left there was residual flap in the distal left external iliac which was resolved with an 8 mm EV3 stent that I deployed without any issues and postdilated this with 9 mm balloon.  Repeat aortogram revealed wide patency of the great vessels with only faint trace opacification of the false lumen proximally with excellent wall apposition throughout otherwise and flow through the celiac and SMA.  The renals were chronically occluded and atretic due to his end-stage renal disease.  The iliacs were widely patent with preservation of flow to both internal iliac arteries as well.  Retrograde angiograms revealed excellent flow through the lower extremities with no stenosis.  At this point we were satisfied and opted to terminate the procedure.     All catheters and wires were withdrawn.  We then since the Pro-glide Perclose sutures which were cinched locked and cut thereby closing the arteriotomy site.  Manual pressure was held.  Protamine was administered for reversal of heparinization.  Upon completion there was evidence of excellent hemostasis and the skin was closed with 3-0 Vicryl suture and with placement of Dermabond.  Sterile dressings were placed.  The patient awoke from general anesthesia was extubated and taken to the ICU in stable condition.  All counts were correct at the end the case.  At completion he was noted to have palpable pulses in the lower extremities and was found to be neurovascularly intact.

## 2025-01-20 NOTE — PROGRESS NOTES
[facility]  Nephrology Progress Note    Christian Hernandez Patient Status:  Inpatient    1985 MRN [id number]   [facility] 4SW-A Attending Dakota Gómez MD   Hosp Day # 1 PCP OREN GALAVIZ MD     Subjective:  He reports feeling some pain but improved.     Objective:  Vital signs: Blood pressure 133/75, pulse 96, temperature (!) 100.8 °F (38.2 °C), resp. rate 16, weight 185 lb 6.5 oz (84.1 kg), SpO2 95%.  General: No acute distress. Alert and oriented x 3.  HEENT: Moist mucous membranes. EOM-I.   Neck: No lymphadenopathy.  No JVD.   Respiratory: Decreased breath sounds at the bases bilaterally  Cardiovascular: S1, S2.  Regular rate and rhythm.    Neurologic: No focal neurological deficits.  Musculoskeletal:  No swelling noted.  Integument: No lesions. No erythema.    Current Facility-Administered Medications   Medication Dose Route Frequency    acetaminophen (Tylenol Extra Strength) tab 1,000 mg  1,000 mg Oral Q6H PRN    sodium chloride 0.9 % IV bolus 100 mL  100 mL Intravenous Q30 Min PRN    And    albumin human (Albumin) 25% injection 25 g  25 g Intravenous PRN Dialysis    prochlorperazine (Compazine) 10 MG/2ML injection 5 mg  5 mg Intravenous Q8H PRN    HYDROmorphone (Dilaudid) 1 MG/ML injection 0.2 mg  0.2 mg Intravenous Q2H PRN    Or    HYDROmorphone (Dilaudid) 1 MG/ML injection 0.4 mg  0.4 mg Intravenous Q2H PRN    Or    HYDROmorphone (Dilaudid) 1 MG/ML injection 0.8 mg  0.8 mg Intravenous Q2H PRN    lamoTRIgine (LaMICtal) tab 150 mg  150 mg Oral BID    niCARdipine in sodium chloride 0.86% (carDENE) 20 mg/200mL infusion premix  5-15 mg/hr Intravenous Continuous    hydrALAzine (Apresoline) 20 mg/mL injection 10 mg  10 mg Intravenous Q4H PRN    melatonin tab 3 mg  3 mg Oral Nightly PRN    polyethylene glycol (PEG 3350) (Miralax) 17 g oral packet 17 g  17 g Oral Daily PRN    sennosides (Senokot) tab 17.2 mg  17.2 mg Oral Nightly PRN    bisacodyl (Dulcolax) 10 MG rectal  suppository 10 mg  10 mg Rectal Daily PRN    ondansetron (Zofran) 4 MG/2ML injection 4 mg  4 mg Intravenous Q6H PRN    amLODIPine (Norvasc) tab 10 mg  10 mg Oral Daily    heparin (Porcine) 1000 UNIT/ML injection 1,500 Units  1.5 mL Intracatheter PRN Dialysis       Recent Labs     01/18/25 1709 01/19/25 0416 01/20/25  0444 01/20/25  0534   WBC 9.5 11.1* 19.6* 17.2*   HGB 9.0* 8.6* 5.5* 7.8*   MCV 91.3 93.3 93.1 92.5   .0 172.0 167.0 154.0   INR  --  1.28*  --   --        Recent Labs     01/18/25 1709 01/19/25 0416 01/20/25  0444    137 134*   K 5.1 5.8* 4.9    100 101   CO2 29.0 26.0 27.0   BUN 48* 51* 30*   CREATSERUM 10.37* 10.96* 7.94*   CA 8.6* 8.2* 8.5*   MG  --  2.2 2.0   PHOS  --  7.0* 6.1*       Recent Labs     01/18/25 1709 01/19/25 0416 01/20/25  0444   ALT 7* <7* <7*   AST 10 11 11   ALB 3.9 3.9 3.3         Assessment/Plan:  1) ESRD: 2/2 failed kidney transplant. Receives HD MWF as outpatient, plan for HD again today for additional volume and BP optimization.     2) Descending aortic dissection with thrombus -> endovascular repair / PTA + stenting AO / bilat iliacs 1/19 by Dr. 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 Please feel free to call me with any questions or concerns.     Ashanti Knapp MD  01/20/25

## 2025-01-20 NOTE — PROGRESS NOTES
Vascular Surgery Progress Note    No acute events overnight. Patient reports mild soreness at bilateral access sites and some abdominal distention- controlled with PRN pain medication. Patient up to the chair this morning on 5L NC- sats are 99%. Bilateral groins are C/D/I and soft and flat. Bilateral palpable DP/PT pulses. Neurologically intact in the lower extremities. CT chest/abd/pelvis reviewed by Dr. Small- patient okay to transfer out of ICU once cardene gtt is weaned off and arterial line removed. SBP goal 120-180. Continue to wean O2 as able. Patient to be up to the chair and ambulating as tolerated.

## 2025-01-21 ENCOUNTER — APPOINTMENT (OUTPATIENT)
Dept: GENERAL RADIOLOGY | Facility: HOSPITAL | Age: 40
End: 2025-01-21
Payer: MEDICAID

## 2025-01-21 LAB
ALBUMIN SERPL-MCNC: 3.6 G/DL (ref 3.2–4.8)
ALBUMIN/GLOB SERPL: 1.2 {RATIO} (ref 1–2)
ALP LIVER SERPL-CCNC: 69 U/L
ALT SERPL-CCNC: <7 U/L
ANION GAP SERPL CALC-SCNC: 9 MMOL/L (ref 0–18)
AST SERPL-CCNC: 18 U/L (ref ?–34)
BASOPHILS # BLD AUTO: 0.02 X10(3) UL (ref 0–0.2)
BASOPHILS NFR BLD AUTO: 0.1 %
BILIRUB SERPL-MCNC: 0.2 MG/DL (ref 0.3–1.2)
BUN BLD-MCNC: 29 MG/DL (ref 9–23)
CALCIUM BLD-MCNC: 9 MG/DL (ref 8.7–10.6)
CHLORIDE SERPL-SCNC: 98 MMOL/L (ref 98–112)
CO2 SERPL-SCNC: 27 MMOL/L (ref 21–32)
CREAT BLD-MCNC: 7.26 MG/DL
EGFRCR SERPLBLD CKD-EPI 2021: 9 ML/MIN/1.73M2 (ref 60–?)
EOSINOPHIL # BLD AUTO: 0.1 X10(3) UL (ref 0–0.7)
EOSINOPHIL NFR BLD AUTO: 0.6 %
ERYTHROCYTE [DISTWIDTH] IN BLOOD BY AUTOMATED COUNT: 15.9 %
GLOBULIN PLAS-MCNC: 2.9 G/DL (ref 2–3.5)
GLUCOSE BLD-MCNC: 74 MG/DL (ref 70–99)
HCT VFR BLD AUTO: 23.8 %
HGB BLD-MCNC: 7.7 G/DL
IMM GRANULOCYTES # BLD AUTO: 0.12 X10(3) UL (ref 0–1)
IMM GRANULOCYTES NFR BLD: 0.7 %
ISTAT ACTIVATED CLOTTING TIME: 285 SECONDS (ref 125–137)
LYMPHOCYTES # BLD AUTO: 1.08 X10(3) UL (ref 1–4)
LYMPHOCYTES NFR BLD AUTO: 6.7 %
MCH RBC QN AUTO: 30 PG (ref 26–34)
MCHC RBC AUTO-ENTMCNC: 32.4 G/DL (ref 31–37)
MCV RBC AUTO: 92.6 FL
MONOCYTES # BLD AUTO: 2.71 X10(3) UL (ref 0.1–1)
MONOCYTES NFR BLD AUTO: 16.7 %
NEUTROPHILS # BLD AUTO: 12.19 X10 (3) UL (ref 1.5–7.7)
NEUTROPHILS # BLD AUTO: 12.19 X10(3) UL (ref 1.5–7.7)
NEUTROPHILS NFR BLD AUTO: 75.2 %
OSMOLALITY SERPL CALC.SUM OF ELEC: 282 MOSM/KG (ref 275–295)
PLATELET # BLD AUTO: 139 10(3)UL (ref 150–450)
POTASSIUM SERPL-SCNC: 4.4 MMOL/L (ref 3.5–5.1)
PROT SERPL-MCNC: 6.5 G/DL (ref 5.7–8.2)
RBC # BLD AUTO: 2.57 X10(6)UL
SODIUM SERPL-SCNC: 134 MMOL/L (ref 136–145)
WBC # BLD AUTO: 16.2 X10(3) UL (ref 4–11)

## 2025-01-21 PROCEDURE — 71045 X-RAY EXAM CHEST 1 VIEW: CPT

## 2025-01-21 PROCEDURE — 99232 SBSQ HOSP IP/OBS MODERATE 35: CPT | Performed by: INTERNAL MEDICINE

## 2025-01-21 PROCEDURE — 99291 CRITICAL CARE FIRST HOUR: CPT | Performed by: INTERNAL MEDICINE

## 2025-01-21 PROCEDURE — 99233 SBSQ HOSP IP/OBS HIGH 50: CPT | Performed by: INTERNAL MEDICINE

## 2025-01-21 RX ORDER — ALBUMIN (HUMAN) 12.5 G/50ML
25 SOLUTION INTRAVENOUS
Status: DISCONTINUED | OUTPATIENT
Start: 2025-01-22 | End: 2025-01-23

## 2025-01-21 RX ORDER — HYDRALAZINE HYDROCHLORIDE 50 MG/1
100 TABLET, FILM COATED ORAL 3 TIMES DAILY
Status: DISCONTINUED | OUTPATIENT
Start: 2025-01-21 | End: 2025-01-25

## 2025-01-21 NOTE — PROGRESS NOTES
[facility]   part of [facility]     Hospitalist Progress Note     Christian Hernandez Patient Status:  Inpatient    1985 MRN [id number]   Location [facility] 4SW-A Attending Dakota Gómez MD   Hosp Day # 2 PCP OREN GALAVIZ MD     Chief Complaint: Aortic dissection    Subjective:     Has no complaints     Objective:    Review of Systems:   A comprehensive review of systems was completed; pertinent positive and negatives stated in subjective.    Vital signs:  Temp:  [100 °F (37.8 °C)-101.6 °F (38.7 °C)] 100 °F (37.8 °C)  Pulse:  [] 86  Resp:  [8-24] 13  BP: (122-191)/(55-92) 155/84  SpO2:  [89 %-100 %] 100 %    Physical Exam:    General: No acute distress  Respiratory: No wheezes, no rhonchi  Cardiovascular: S1, S2, regular rate and rhythm  Abdomen: Soft, Non-tender, non-distended, positive bowel sounds  Neuro: No new focal deficits.   Extremities: No edema      Diagnostic Data:    Labs:  Recent Labs   Lab 25  1709 25  0416 25  0444 25  0534 25  0508   WBC 9.5 11.1* 19.6* 17.2* 16.2*   HGB 9.0* 8.6* 5.5* 7.8* 7.7*   MCV 91.3 93.3 93.1 92.5 92.6   .0 172.0 167.0 154.0 139.0*   INR  --  1.28*  --   --   --        Recent Labs   Lab 25  0416 25  0444 25  0508   * 107* 74   BUN 51* 30* 29*   CREATSERUM 10.96* 7.94* 7.26*   CA 8.2* 8.5* 9.0   ALB 3.9 3.3 3.6    134* 134*   K 5.8* 4.9 4.4    101 98   CO2 26.0 27.0 27.0   ALKPHO 68 58 69   AST 11 11 18   ALT <7* <7* <7*   BILT 0.2* <0.2* 0.2*   TP 6.8 6.4 6.5       Estimated Glomerular Filtration Rate: 9.2 mL/min/1.73m2 (A) (by CKD-EPI based on SCr of 7.26 mg/dL (H)).    Recent Labs   Lab 25  1709   TROPHS 50          Recent Labs   Lab 25  0416   PTP 16.1*   INR 1.28*                  Microbiology    Hospital Encounter on 25   1. Blood Culture     Status: None (Preliminary result)    Collection Time: 25  5:04 AM    Specimen:  Blood,peripheral   Result Value Ref Range    Blood Culture Result No Growth 1 Day N/A         Imaging: Reviewed in Epic.    Medications:    hydrALAZINE  100 mg Oral TID    [START ON 1/22/2025] epoetin carl  10,000 Units Intravenous Once in dialysis    carvedilol  25 mg Oral BID with meals    piperacillin-tazobactam  3.375 g Intravenous Q12H    lamoTRIgine  150 mg Oral BID    amLODIPine  10 mg Oral Daily       Assessment & Plan:      #Descending Type B aortic dissection with severe compression/malperfusion of BLLE  -s/p thoracic endovascular aortic repair, angioplasty/stenting of aorta and R/L common iliac aa., R/L external iliac aa.  on 1/19   -management per Vascular surgery   -SBP goal 140-180, cardene gtt off    #Acute hypoxic respiratory failure  -volume management with HD  -wean supplemental O2 as tolerated   -Nephrology and pulmonology on consult     #Sepsis  -source seems to be PNA, Zosyn  -Blood Cultures without growth      #Acute anemia 2/2 blood loss on chronic anemia due to ESRD  -expected and related to surgery, no signs or sx of significant active bleeding   -monitor and transfuse PRBC to maintain Hb >7    #ESRD with history of failed renal transplant  -nephrology consult  -HD per nephro     #Hx of CVA  #Cerebrovascular disease  #Seizure disorder  -lamotrigine    #Right sided paratracheal, inguinal and axillary LAD  -probably reactive      Matthias Alfonso DO    Supplementary Documentation:     Quality:  DVT Mechanical Prophylaxis:   SCDs, Early ambuation  DVT Pharmacologic Prophylaxis   Medication    heparin (Porcine) 1000 UNIT/ML injection 1,500 Units                Code Status: Full Code  Rangel: No urinary catheter in place      The 21st Century Cures Act makes medical notes like these available to patients in the interest of transparency. Please be advised this is a medical document. Medical documents are intended to carry relevant information, facts as evident, and the clinical opinion of the  practitioner. The medical note is intended as peer to peer communication and may appear blunt or direct. It is written in medical language and may contain abbreviations or verbiage that are unfamiliar.

## 2025-01-21 NOTE — PROGRESS NOTES
[facility]  Pulmonary/Critical Care Progress note    Christian Hernandez Patient Status:  Inpatient    1985 MRN [id number]   Location [facility] 4SW-A Attending Dakota Gómez MD   Hosp Day # 2 PCP OREN GALAVIZ MD       History of Present Illness:   Hypertensive overnight despite hydralazine IV.  Oral medications initiated including amlodipine 10 mg daily and Coreg 25 mg oral twice daily.  Was hypertensive overnight with systolic pressure 195 despite IV hydralazine 10 mg push x 1    Overnight again with fevers/so switched to Zosyn from Unasyn      The patient admits to mild abdominal pains.    History:  Past Medical History:    Essential hypertension    Seizure disorder (HCC)    Stroke (HCC)     Past Surgical History:   Procedure Laterality Date    Transplantation of kidney  2017     No family history on file.   reports that he has never smoked. He has never used smokeless tobacco.    Allergies:  Allergies[1]    Medications:    Current Facility-Administered Medications:     acetaminophen (Tylenol Extra Strength) tab 1,000 mg, 1,000 mg, Oral, Q6H PRN    sodium chloride 0.9 % IV bolus 100 mL, 100 mL, Intravenous, Q30 Min PRN **AND** albumin human (Albumin) 25% injection 25 g, 25 g, Intravenous, PRN Dialysis    carvedilol (Coreg) tab 25 mg, 25 mg, Oral, BID with meals    acetaminophen (Ofirmev) 10 mg/mL infusion premix 1,000 mg, 1,000 mg, Intravenous, Q6H PRN    piperacillin-tazobactam (Zosyn) 3.375 g in dextrose 5% 100 mL IVPB-ADDV, 3.375 g, Intravenous, Q12H    prochlorperazine (Compazine) 10 MG/2ML injection 5 mg, 5 mg, Intravenous, Q8H PRN    HYDROmorphone (Dilaudid) 1 MG/ML injection 0.2 mg, 0.2 mg, Intravenous, Q2H PRN **OR** HYDROmorphone (Dilaudid) 1 MG/ML injection 0.4 mg, 0.4 mg, Intravenous, Q2H PRN **OR** HYDROmorphone (Dilaudid) 1 MG/ML injection 0.8 mg, 0.8 mg, Intravenous, Q2H PRN    lamoTRIgine (LaMICtal) tab 150 mg, 150 mg, Oral, BID    niCARdipine in sodium chloride 0.86%  (carDENE) 20 mg/200mL infusion premix, 5-15 mg/hr, Intravenous, Continuous    hydrALAzine (Apresoline) 20 mg/mL injection 10 mg, 10 mg, Intravenous, Q4H PRN    melatonin tab 3 mg, 3 mg, Oral, Nightly PRN    polyethylene glycol (PEG 3350) (Miralax) 17 g oral packet 17 g, 17 g, Oral, Daily PRN    sennosides (Senokot) tab 17.2 mg, 17.2 mg, Oral, Nightly PRN    bisacodyl (Dulcolax) 10 MG rectal suppository 10 mg, 10 mg, Rectal, Daily PRN    ondansetron (Zofran) 4 MG/2ML injection 4 mg, 4 mg, Intravenous, Q6H PRN    amLODIPine (Norvasc) tab 10 mg, 10 mg, Oral, Daily    heparin (Porcine) 1000 UNIT/ML injection 1,500 Units, 1.5 mL, Intracatheter, PRN Dialysis    Intake/Output:    Intake/Output Summary (Last 24 hours) at 1/21/2025 0655  Last data filed at 1/21/2025 0540  Gross per 24 hour   Intake 1676.82 ml   Output 2000 ml   Net -323.18 ml      Body mass index is 25.06 kg/m².    Review of Systems  Review of Systems:   A comprehensive 10 point review of systems was completed.  Pertinent positives and negatives noted in the the HPI.            Vitals:    01/21/25 0300 01/21/25 0400 01/21/25 0500 01/21/25 0600   BP: 157/85 (!) 161/72 (!) 191/92 (!) 187/92   BP Location:  Left arm     Pulse: 92 91 93 98   Resp: 18 14 (!) 8 16   Temp:  100.2 °F (37.9 °C)     TempSrc:  Temporal     SpO2: 98% 100% 98% 97%   Weight:             Physical Exam  Constitutional:       General: He is not in acute distress.     Appearance: Normal appearance. He is not ill-appearing or diaphoretic.   HENT:      Head: Normocephalic and atraumatic.      Nose: Nose normal. No congestion or rhinorrhea.      Mouth/Throat:      Mouth: Mucous membranes are moist.      Pharynx: Oropharynx is clear. No oropharyngeal exudate or posterior oropharyngeal erythema.      Comments: Mallampati class IV palate   Eyes:      Extraocular Movements: Extraocular movements intact.      Pupils: Pupils are equal, round, and reactive to light.   Cardiovascular:      Rate and  Rhythm: Normal rate.      Pulses: Normal pulses.      Heart sounds: Normal heart sounds. No murmur heard.  Pulmonary:      Effort: Pulmonary effort is normal. No respiratory distress.      Breath sounds: Normal breath sounds. No wheezing or rhonchi.   Chest:      Chest wall: No tenderness.   Abdominal:      Palpations: Abdomen is soft.   Musculoskeletal:         General: Normal range of motion.   Skin:     General: Skin is warm.   Neurological:      General: No focal deficit present.      Mental Status: He is alert and oriented to person, place, and time.   Psychiatric:         Mood and Affect: Mood normal.         Behavior: Behavior normal.         Thought Content: Thought content normal.         Judgment: Judgment normal.            Lab Data Review:  Recent Labs   Lab 01/20/25  0444 01/20/25  0534 01/21/25  0508   WBC 19.6* 17.2* 16.2*   HGB 5.5* 7.8* 7.7*   HCT 16.3* 23.5* 23.8*   .0 154.0 139.0*       Recent Labs   Lab 01/19/25  0416 01/20/25  0444 01/21/25  0508    134* 134*   K 5.8* 4.9 4.4    101 98   CO2 26.0 27.0 27.0   BUN 51* 30* 29*   CREATSERUM 10.96* 7.94* 7.26*   CA 8.2* 8.5* 9.0   ALB 3.9 3.3 3.6   ALKPHO 68 58 69   ALT <7* <7* <7*   AST 11 11 18   * 107* 74       Recent Labs   Lab 01/19/25  0416 01/20/25  0444   MG 2.2 2.0       Lab Results   Component Value Date    PHOS 6.1 (H) 01/20/2025        Recent Labs   Lab 01/18/25  1709 01/19/25  0416   INR  --  1.28*   PTT 30.0  --        No results for input(s): \"ABGPHT\", \"ZLXTNY8X\", \"FHIMV5J\", \"ABGHCO3\", \"ABGBE\", \"TEMP\", \"JOSE L\", \"SITE\", \"DEV\", \"THGB\" in the last 168 hours.    No results for input(s): \"TROP\", \"CKMB\" in the last 168 hours.    Cultures: No results found for this visit on 01/19/25.        Radiology personally reviewed:  CTA CHEST CTA ABDOMEN CTA PELVIS (CPT=71275/90206)    Result Date: 1/19/2025  CONCLUSION:   1. Changes related to interval endovascular treatment of previous type B dissection of the aorta. There is  persistent partial filling of the false lumen along the proximal thoracic aorta measuring up to 18 mm in thickness which tapers distally.  The stent begins just beyond the origin of the left subclavian artery.  A small portion of the dissection flap persists near the origin of the left vertebral artery which originates directly from the thoracic aorta. There is persistent filling of a portion of the false lumen in the upper abdominal aorta measuring up to 5 mm in thickness beginning near the origin of the celiac artery and extending below the level of the native renal arteries.  There are stents also noted extending into the common iliac arteries and external iliac arteries.  There is a small dissection flap in the right common femoral artery with filling of the false lumen measuring up to 4 mm in thickness.  2. Scattered areas of atelectasis/consolidation in the lungs, most pronounced in the lower lobes.  Patchy ground-glass opacity and interstitial thickening elsewhere in the lungs.  The overall findings could be due to multifocal pneumonia, with pulmonary edema or other etiologies not entirely excluded.  Clinical correlation recommended along with follow-up until complete resolution.  3. Mildly enlarged paratracheal lymph nodes measuring up to 2.2 x 1.3 cm. There are enlarged right axillary lymph nodes measuring up to 2.3 x 1.5 cm. Enlarged right inguinal lymph nodes measuring up to 1.9 x 1.2 cm.  While these lymph nodes could be reactive, with other etiologies not entirely excluded.  Clinical correlation recommended.  PET-CT may be of further value.  4. Small left pleural effusion.  5. Transplanted right pelvic kidney noted.  There is marked heterogeneity of the transplanted right pelvic kidney with scattered areas of calcification in linear bands of density that is concerning for changes related to rejection.  Clinical correlation recommended.  Please see above for further details.  LOCATION:  [facility]  by (CST): Cj Lance MD on 1/19/2025 at 8:27 PM     Finalized by (CST): Cj Lance MD on 1/19/2025 at 8:39 PM         Patient Active Problem List   Diagnosis    Aortic dissection (HCC)    Dissection of aorta, unspecified portion of aorta (HCC)    Primary hypertension    Dissection of aorta, thoracoabdominal (HCC)    Descending thoracic aortic dissection (HCC)    ESRD (end stage renal disease) (HCC)    Anemia in ESRD (end-stage renal disease) (HCC)    Acute hypoxic respiratory failure (HCC)     38 y/o with h/o HTN, ESRD on HD and SZR d/o who was admitted to [facility] with c/o LLE pain along with lower back pain. Denies recent MVA/trauma/falls. Has h/o similar pain in the past but not as severe and did not seek medical w/u. In ER, pt underwent w/u including CTA C/A/P which revealed a large descending aortic dissection (Sarbjit type B) along with aortic thrombus and pulmonary edema. Was evaluated by PV surgery and required emergent intervention.     Assessment/plan:      Pulmonary:  Acute hypoxic resp failure suspect due to pulmonary edema with volume overload- extubated in PACU requiring supplemental oxygen 4 L pulmonary by nasal cannula and oxygen saturations of 100%  Bibasilar atelectasis/infiltrate: Chest x-ray shows: persistent areas of ground-glass opacity and   atelectasis/consolidation in the lungs, again most pronounced in the mid to lower left lung and medial right lower lung that is concerning for multifocal pneumonia,       Plan:  NIPPV as needed for increased work of breathing  Wean FiO2 to keep oxygen saturation between 90% to 92%.    Cardiovascular:  HTN emergency - with associated pulmonary edema and dissection of descending aorta  Descending aortic dissection (Sarbjit type B)      Plan:  - s/p endovascular repair, plasty, stenting of aorta, bilateral iliac arteries (1/19)  - PV surgery managing closely  -Continue BP control with Cardene drip  - analgesic support  - tight BP control  with goal -180  Continue home amlodipine gradually to optimal dose and resume coreg, hydralazine tid-     Hematologic/coagulation  Anemia: Suspect due to CKD and postop on Epogen with hemodialysis    Plan:  Continue to monitor hemoglobin and transfuse PRBC for hemoglobin less than 7 g    Renal/FEN  Hypertension   ESRD on HD, failed renal transplant 2017      Plan:  F/E/N- minimize IVFs, lytes per protocol, ADAT  - MWF HD;   -Renal consult appreciated   Nephrology to adjust antihypertensive medications in collaboration with vascular surgery      Endocrine/DEM  Blood sugars controlled    Plan:  Monitor    Gastroenterology/hepatology   Tolerating oral renal diet  No BM yestr     Plan:  Continue to monitor symptoms    Infectious disease:  Fever      Plan:  Check blood blood cultures ordered  Zosyn IVPB     Neurologic/psychiatry  Neuro- h/o CVA and subsequent seizure disorder clinically stable      Plan:  - no signs of active seizure  - resume lamictal  - monitor for seizures    Musculoskeletal/rheumatology  No evidence of musculoskeletal disease  Pain     Plan:  tramadol as needed     Prophylaxis:    DVT prophylaxis: SCD boots   GI prophylaxis: ---    Lines:  PIV, HD line, Arterial Line  Catheters:Anuric   Feeding: renal diet     Disposition:  ICU monitoring    CODE STATUS: Full code            Raza Gomez MD    35  minutes of critical care time were spent at the bedside performing history, physical, complex data interpretation, radiographic interpretation, discussion with consultants, and creating a diagnostic and therapeutic treatment plan for this critically ill patient who is at risk for clinical deterioration. Time spent was excluding time any billable procedures      Note to the patient: The 21st Century Cures Act makes medical notes like these available to patients in the interest of transparency. However, be advised that this is a medical document. It is intended as peer to peer communication. It is  written in medical language and may contain abbreviations or verbiage that are unfamiliar. It may appear blunt or direct. Medical documents are intended to carry relevant information, facts as evident, and clinical opinion of the practitioner.      Disclaimer: Components of this note were documented using voice recognition system and are subject to errors not corrected at proofreading. Contact the author of this note for any clarifications          [1] No Known Allergies

## 2025-01-21 NOTE — PLAN OF CARE
Pt received following bedside report. Alert and oriented. On 4L nasal canula. NSR, SBP goal 120-180 -  hydralazine x1.Febrile, tmax 101.6 - Tylenol given, pain managed throughout shift (see MAR). No signs of hematoma or complications with incision sites. Patient updated on plan of care. See MAR and flowsheets.

## 2025-01-21 NOTE — PROGRESS NOTES
[facility]  Nephrology Progress Note    Christian Hernandez Patient Status:  Inpatient    1985 MRN [id number]   [facility] 4SW-A Attending Dakota Gómez MD   Hosp Day # 2 PCP OREN GALAVIZ MD     Subjective:  Febrile, having some mild abdominal pain. Back on cardene this morning. No issues with HD yesterday    Objective:  Vital signs: Blood pressure (!) 178/90, pulse 105, temperature (!) 101.6 °F (38.7 °C), temperature source Temporal, resp. rate 15, weight 184 lb 11.9 oz (83.8 kg), SpO2 98%.  General: No acute distress. Alert and oriented x 3.  HEENT: Moist mucous membranes. EOM-I.   Neck: No lymphadenopathy.  No JVD.   Respiratory: Decreased breath sounds at the bases bilaterally  Cardiovascular: S1, S2.  Regular rate and rhythm.    Neurologic: No focal neurological deficits.  Musculoskeletal:  No swelling noted.  Integument: No lesions. No erythema.    Current Facility-Administered Medications   Medication Dose Route Frequency    hydrALAZINE (Apresoline) tab 100 mg  100 mg Oral TID    acetaminophen (Tylenol Extra Strength) tab 1,000 mg  1,000 mg Oral Q6H PRN    sodium chloride 0.9 % IV bolus 100 mL  100 mL Intravenous Q30 Min PRN    And    albumin human (Albumin) 25% injection 25 g  25 g Intravenous PRN Dialysis    carvedilol (Coreg) tab 25 mg  25 mg Oral BID with meals    acetaminophen (Ofirmev) 10 mg/mL infusion premix 1,000 mg  1,000 mg Intravenous Q6H PRN    piperacillin-tazobactam (Zosyn) 3.375 g in dextrose 5% 100 mL IVPB-ADDV  3.375 g Intravenous Q12H    prochlorperazine (Compazine) 10 MG/2ML injection 5 mg  5 mg Intravenous Q8H PRN    HYDROmorphone (Dilaudid) 1 MG/ML injection 0.2 mg  0.2 mg Intravenous Q2H PRN    Or    HYDROmorphone (Dilaudid) 1 MG/ML injection 0.4 mg  0.4 mg Intravenous Q2H PRN    Or    HYDROmorphone (Dilaudid) 1 MG/ML injection 0.8 mg  0.8 mg Intravenous Q2H PRN    lamoTRIgine (LaMICtal) tab 150 mg  150 mg Oral BID    niCARdipine in sodium chloride  0.86% (carDENE) 20 mg/200mL infusion premix  5-15 mg/hr Intravenous Continuous    hydrALAzine (Apresoline) 20 mg/mL injection 10 mg  10 mg Intravenous Q4H PRN    melatonin tab 3 mg  3 mg Oral Nightly PRN    polyethylene glycol (PEG 3350) (Miralax) 17 g oral packet 17 g  17 g Oral Daily PRN    sennosides (Senokot) tab 17.2 mg  17.2 mg Oral Nightly PRN    bisacodyl (Dulcolax) 10 MG rectal suppository 10 mg  10 mg Rectal Daily PRN    ondansetron (Zofran) 4 MG/2ML injection 4 mg  4 mg Intravenous Q6H PRN    amLODIPine (Norvasc) tab 10 mg  10 mg Oral Daily    heparin (Porcine) 1000 UNIT/ML injection 1,500 Units  1.5 mL Intracatheter PRN Dialysis       Recent Labs     01/18/25 1709 01/19/25 0416 01/20/25 0444 01/20/25  0534 01/21/25  0508   WBC 9.5 11.1* 19.6* 17.2* 16.2*   HGB 9.0* 8.6* 5.5* 7.8* 7.7*   MCV 91.3 93.3 93.1 92.5 92.6   .0 172.0 167.0 154.0 139.0*   INR  --  1.28*  --   --   --        Recent Labs     01/18/25 1709 01/19/25 0416 01/20/25 0444 01/21/25  0508    137 134* 134*   K 5.1 5.8* 4.9 4.4    100 101 98   CO2 29.0 26.0 27.0 27.0   BUN 48* 51* 30* 29*   CREATSERUM 10.37* 10.96* 7.94* 7.26*   CA 8.6* 8.2* 8.5* 9.0   MG  --  2.2 2.0  --    PHOS  --  7.0* 6.1*  --        Recent Labs     01/18/25 1709 01/19/25 0416 01/20/25 0444 01/21/25  0508   ALT 7* <7* <7* <7*   AST 10 11 11 18   ALB 3.9 3.9 3.3 3.6     Assessment/Plan:  1) ESRD: 2/2 failed kidney transplant. Receives HD MWF as outpatient, plan for HD tomorrow.      2) Descending aortic dissection with thrombus -> endovascular repair / PTA + stenting AO / bilat iliacs 1/19 by Dr. 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 Volume removal with HD, on Zosyn per ICU    Thank you for allowing me to participate in this patient's care. Please feel free to call me with any questions or concerns.     Ashanti Knapp MD  01/21/25

## 2025-01-21 NOTE — CM/SW NOTE
01/21/25 1500   CM/SW Referral Data   Referral Source    Reason for Referral Discharge planning   Informant Patient   Medical Hx   Does patient have an established PCP? Yes   Patient Info   Number of Levels in Home 2   Patient lives with Spouse/Significant other   Patient Status Prior to Admission   Independent with ADLs and Mobility Yes   Discharge Needs   Anticipated D/C needs No anticipated discharge needs     Met with patient at bedside to discuss discharge planning. Pt stating he was independent with ADLs prior to admission. Per pt spouse has the ability to work from home and will be able to assist as needed. Pt was ambulating today without difficulty. Pt currently on 4L NC. Will continue to monitor for needs.     Meka Meadows, MATEO RN, CM  [phone]

## 2025-01-21 NOTE — SPIRITUAL CARE NOTE
Spiritual Care Visit Note    Patient Name: Christian Hernandez Date of Spiritual Care Visit: 25   : 1985 Primary Dx: <principal problem not specified>       Referred By:      Spiritual Care Taxonomy:    Intended Effects: Establish rapport and connectedness    Methods: Collaborate with care team member;Offer emotional support;Offer spiritual/Jehovah's witness support    Interventions: Acknowledge current situation;Active listening;Explain  role    Visit Type/Summary:     - Spiritual Care: Consulted with RN prior to visit. When I entered the room, patient was resting with his eyes closed. Provided an intro to spiritual care.  Patient expressed appreciation for  visit. Provided information regarding how to contact Spiritual Care and left a Spiritual Care information card.  remains available as needed for follow up.    Spiritual Care support can be requested via an Epic consult. For urgent/immediate needs, please contact the On Call  at: Edward: ext [phone]    Rev Tori Ballard MA

## 2025-01-21 NOTE — PROGRESS NOTES
[facility]   part of [facility]     Vascular Surgery Progress Note    Christian Hernandez Patient Status:  Inpatient    1985 MRN [id number]   Location [facility] 4SW-A Attending Dakota Gómez MD   Hosp Day # 2 PCP OREN GALAVIZ MD     Objective:   Temp: 100.2 °F (37.9 °C)  Pulse: 98  Resp: 16  BP: 187/92  AO: 198/78      Events Yesterday/Overnight:  Temp 101.1 overnight. WBC elevated at 17.2 today. Respiratory panel negative. Blood cultures pending. Patient on 4L O2. BP this morning 187/92, IV cardene restarted this morning.       Exam:  Patient neurologically intact. Bilateral groin sites soft. No hematoma.     Impression/Plan:    Patient up in chair with meals. Able to ambulate as tolerated.     Restart hydralazine 100 mg TID.     Medications:  Current Facility-Administered Medications   Medication Dose Route Frequency    acetaminophen (Tylenol Extra Strength) tab 1,000 mg  1,000 mg Oral Q6H PRN    sodium chloride 0.9 % IV bolus 100 mL  100 mL Intravenous Q30 Min PRN    And    albumin human (Albumin) 25% injection 25 g  25 g Intravenous PRN Dialysis    carvedilol (Coreg) tab 25 mg  25 mg Oral BID with meals    acetaminophen (Ofirmev) 10 mg/mL infusion premix 1,000 mg  1,000 mg Intravenous Q6H PRN    piperacillin-tazobactam (Zosyn) 3.375 g in dextrose 5% 100 mL IVPB-ADDV  3.375 g Intravenous Q12H    prochlorperazine (Compazine) 10 MG/2ML injection 5 mg  5 mg Intravenous Q8H PRN    HYDROmorphone (Dilaudid) 1 MG/ML injection 0.2 mg  0.2 mg Intravenous Q2H PRN    Or    HYDROmorphone (Dilaudid) 1 MG/ML injection 0.4 mg  0.4 mg Intravenous Q2H PRN    Or    HYDROmorphone (Dilaudid) 1 MG/ML injection 0.8 mg  0.8 mg Intravenous Q2H PRN    lamoTRIgine (LaMICtal) tab 150 mg  150 mg Oral BID    niCARdipine in sodium chloride 0.86% (carDENE) 20 mg/200mL infusion premix  5-15 mg/hr Intravenous Continuous    hydrALAzine (Apresoline) 20 mg/mL injection 10 mg  10 mg Intravenous Q4H PRN    melatonin  tab 3 mg  3 mg Oral Nightly PRN    polyethylene glycol (PEG 3350) (Miralax) 17 g oral packet 17 g  17 g Oral Daily PRN    sennosides (Senokot) tab 17.2 mg  17.2 mg Oral Nightly PRN    bisacodyl (Dulcolax) 10 MG rectal suppository 10 mg  10 mg Rectal Daily PRN    ondansetron (Zofran) 4 MG/2ML injection 4 mg  4 mg Intravenous Q6H PRN    amLODIPine (Norvasc) tab 10 mg  10 mg Oral Daily    heparin (Porcine) 1000 UNIT/ML injection 1,500 Units  1.5 mL Intracatheter PRN Dialysis       Laboratory/Data:    LABS:  Recent Labs   Lab 01/18/25 1709 01/19/25 0416 01/20/25 0444 01/20/25  0534 01/21/25  0508   WBC 9.5 11.1* 19.6* 17.2* 16.2*   HGB 9.0* 8.6* 5.5* 7.8* 7.7*   MCV 91.3 93.3 93.1 92.5 92.6   .0 172.0 167.0 154.0 139.0*   INR  --  1.28*  --   --   --        Recent Labs   Lab 01/18/25 1709 01/19/25 0416 01/20/25 0444 01/21/25  0508    137 134* 134*   K 5.1 5.8* 4.9 4.4    100 101 98   CO2 29.0 26.0 27.0 27.0   BUN 48* 51* 30* 29*   CREATSERUM 10.37* 10.96* 7.94* 7.26*   * 110* 107* 74   CA 8.6* 8.2* 8.5* 9.0   MG  --  2.2 2.0  --    PHOS  --  7.0* 6.1*  --      Recent Labs   Lab 01/18/25 1709 01/19/25 0416   PTP  --  16.1*   INR  --  1.28*   PTT 30.0  --      Recent Labs   Lab 01/18/25 1709 01/19/25 0416 01/20/25 0444 01/21/25  0508   ALT 7* <7* <7* <7*   AST 10 11 11 18   ALB 3.9 3.9 3.3 3.6     No results for input(s): \"TROP\" in the last 168 hours.  No results found for: \"ANAS\", \"STORM\", \"ANASCRN\"  Recent Labs   Lab 01/19/25  0546   HBSAG Nonreactive       KIP Polanco  1/21/2025  7:25 AM

## 2025-01-21 NOTE — H&P
Please se referenced H&P on 01/18/2025 at 853 pm    Dakota Gómez MD  [facility]  Vascular Surgery

## 2025-01-22 PROBLEM — Z99.2 ESRD ON HEMODIALYSIS (HCC): Status: ACTIVE | Noted: 2025-01-19

## 2025-01-22 PROBLEM — N18.6 ESRD ON HEMODIALYSIS (HCC): Status: ACTIVE | Noted: 2025-01-19

## 2025-01-22 PROBLEM — I71.21 ASCENDING AORTIC ANEURYSM: Status: ACTIVE | Noted: 2025-01-22

## 2025-01-22 PROBLEM — I10 ESSENTIAL HYPERTENSION: Status: ACTIVE | Noted: 2025-01-18

## 2025-01-22 LAB
ANION GAP SERPL CALC-SCNC: 13 MMOL/L (ref 0–18)
ANTIBODY SCREEN: POSITIVE
BUN BLD-MCNC: 45 MG/DL (ref 9–23)
CALCIUM BLD-MCNC: 8.7 MG/DL (ref 8.7–10.6)
CHLORIDE SERPL-SCNC: 97 MMOL/L (ref 98–112)
CO2 SERPL-SCNC: 24 MMOL/L (ref 21–32)
CREAT BLD-MCNC: 9.99 MG/DL
DIRECT COOMBS POLY: NEGATIVE
EGFRCR SERPLBLD CKD-EPI 2021: 6 ML/MIN/1.73M2 (ref 60–?)
ERYTHROCYTE [DISTWIDTH] IN BLOOD BY AUTOMATED COUNT: 16 %
GLUCOSE BLD-MCNC: 75 MG/DL (ref 70–99)
HCT VFR BLD AUTO: 23.5 %
HGB BLD-MCNC: 7.7 G/DL
HGB BLD-MCNC: 9.2 G/DL
MCH RBC QN AUTO: 30.1 PG (ref 26–34)
MCHC RBC AUTO-ENTMCNC: 32.8 G/DL (ref 31–37)
MCV RBC AUTO: 91.8 FL
OSMOLALITY SERPL CALC.SUM OF ELEC: 288 MOSM/KG (ref 275–295)
PLATELET # BLD AUTO: 160 10(3)UL (ref 150–450)
POTASSIUM SERPL-SCNC: 4.7 MMOL/L (ref 3.5–5.1)
RBC # BLD AUTO: 2.56 X10(6)UL
RGTSCRN: 2
RH BLOOD TYPE: POSITIVE
SODIUM SERPL-SCNC: 134 MMOL/L (ref 136–145)
WBC # BLD AUTO: 14.3 X10(3) UL (ref 4–11)

## 2025-01-22 PROCEDURE — 99233 SBSQ HOSP IP/OBS HIGH 50: CPT | Performed by: INTERNAL MEDICINE

## 2025-01-22 PROCEDURE — 99291 CRITICAL CARE FIRST HOUR: CPT | Performed by: INTERNAL MEDICINE

## 2025-01-22 PROCEDURE — 30233N1 TRANSFUSION OF NONAUTOLOGOUS RED BLOOD CELLS INTO PERIPHERAL VEIN, PERCUTANEOUS APPROACH: ICD-10-PCS | Performed by: SURGERY

## 2025-01-22 PROCEDURE — 99233 SBSQ HOSP IP/OBS HIGH 50: CPT | Performed by: HOSPITALIST

## 2025-01-22 RX ORDER — ECHINACEA PURPUREA EXTRACT 125 MG
1 TABLET ORAL
Status: DISCONTINUED | OUTPATIENT
Start: 2025-01-22 | End: 2025-01-25

## 2025-01-22 RX ORDER — HYDROCODONE BITARTRATE AND ACETAMINOPHEN 5; 325 MG/1; MG/1
1 TABLET ORAL EVERY 6 HOURS PRN
Status: DISCONTINUED | OUTPATIENT
Start: 2025-01-22 | End: 2025-01-23

## 2025-01-22 RX ORDER — LISINOPRIL 20 MG/1
20 TABLET ORAL DAILY
Status: DISCONTINUED | OUTPATIENT
Start: 2025-01-22 | End: 2025-01-23

## 2025-01-22 RX ORDER — SODIUM CHLORIDE 9 MG/ML
INJECTION, SOLUTION INTRAVENOUS ONCE
Status: DISCONTINUED | OUTPATIENT
Start: 2025-01-22 | End: 2025-01-25

## 2025-01-22 NOTE — PROGRESS NOTES
[facility]   part of [facility]     Hospitalist Progress Note     Christian Hernandez Patient Status:  Inpatient    1985 MRN [id number]   Location [facility] 4SW-A Attending Dakota Gómez MD   Hosp Day # 3 PCP OREN GALAVIZ MD     Chief Complaint: Aortic dissection    Subjective:     Pt feels well overall.  Some pain in incisions    Objective:    Review of Systems:   A comprehensive review of systems was completed; pertinent positive and negatives stated in subjective.    Vital signs:  Temp:  [98.2 °F (36.8 °C)-100.6 °F (38.1 °C)] 98.2 °F (36.8 °C)  Pulse:  [83-97] 84  Resp:  [8-22] 14  BP: (143-181)/(74-96) 155/87  SpO2:  [90 %-100 %] 96 %    Physical Exam:    General: No acute distress  Respiratory: No wheezes, no rhonchi  Cardiovascular: S1, S2, regular rate and rhythm  Abdomen: Soft, Non-tender, non-distended, positive bowel sounds  Neuro: No new focal deficits.   Extremities: No edema      Diagnostic Data:    Labs:  Recent Labs   Lab 25  0416 25  0444 25  0534 25  0508 25  0624   WBC 11.1* 19.6* 17.2* 16.2* 14.3*   HGB 8.6* 5.5* 7.8* 7.7* 7.7*   MCV 93.3 93.1 92.5 92.6 91.8   .0 167.0 154.0 139.0* 160.0   INR 1.28*  --   --   --   --        Recent Labs   Lab 25  0416 25  0444 25  0508 25  0624   * 107* 74 75   BUN 51* 30* 29* 45*   CREATSERUM 10.96* 7.94* 7.26* 9.99*   CA 8.2* 8.5* 9.0 8.7   ALB 3.9 3.3 3.6  --     134* 134* 134*   K 5.8* 4.9 4.4 4.7    101 98 97*   CO2 26.0 27.0 27.0 24.0   ALKPHO 68 58 69  --    AST 11 11 18  --    ALT <7* <7* <7*  --    BILT 0.2* <0.2* 0.2*  --    TP 6.8 6.4 6.5  --        Estimated Glomerular Filtration Rate: 6.3 mL/min/1.73m2 (A) (by CKD-EPI based on SCr of 9.99 mg/dL (H)).    Recent Labs   Lab 25  1709   TROPHS 50          Recent Labs   Lab 25  0416   PTP 16.1*   INR 1.28*                  Microbiology    Hospital Encounter on 25   1.  Blood Culture     Status: None (Preliminary result)    Collection Time: 01/20/25  5:04 AM    Specimen: Blood,peripheral   Result Value Ref Range    Blood Culture Result No Growth 2 Days N/A         Imaging: Reviewed in Epic.    Medications:    sodium chloride   Intravenous Once    lisinopril  20 mg Oral Daily    hydrALAZINE  100 mg Oral TID    epoetin carl  10,000 Units Intravenous Once in dialysis    carvedilol  25 mg Oral BID with meals    piperacillin-tazobactam  3.375 g Intravenous Q12H    lamoTRIgine  150 mg Oral BID    amLODIPine  10 mg Oral Daily       Assessment & Plan:      #Descending Type B aortic dissection with severe compression/malperfusion of BLLE  -s/p thoracic endovascular aortic repair, angioplasty/stenting of aorta and R/L common iliac aa., R/L external iliac aa.  on 1/19   Cont. Pain control  BP control    #Acute hypoxic respiratory failure  Resolving  Cont. To wean O2 as able    #Sepsis  Resolved    #Presumptive gram neg PNA  Cont. Zosyn  CX neg  Likely can DC ABX after 5 days if remains stable     #Acute anemia 2/2 blood loss on chronic anemia due to ESRD  -expected and related to surgery, no signs or sx of significant active bleeding   -monitor and transfuse PRBC to maintain Hb >7    #ESRD with history of failed renal transplant  -HD per nephro     #Hx of CVA  #Cerebrovascular disease  #Seizure disorder  -lamotrigine    #Right sided paratracheal, inguinal and axillary LAD  -probably reactive/resolving      Claudy Castillo MD          Supplementary Documentation:   Total time  52 minutes  Quality:  DVT Mechanical Prophylaxis:   SCDs, Early ambuation  DVT Pharmacologic Prophylaxis   Medication    heparin (Porcine) 1000 UNIT/ML injection 1,500 Units                Code Status: Full Code  Rangel: No urinary catheter in place      The 21st Century Cures Act makes medical notes like these available to patients in the interest of transparency. Please be advised this is a medical document. Medical  documents are intended to carry relevant information, facts as evident, and the clinical opinion of the practitioner. The medical note is intended as peer to peer communication and may appear blunt or direct. It is written in medical language and may contain abbreviations or verbiage that are unfamiliar.

## 2025-01-22 NOTE — PLAN OF CARE
Pt received following report. Alert and oriented. On 4L nasal. NSR, SBP goal 120-180. Still febrile, Tmax 100.6 - No BM yet, senna given overnight see eMAR. Chest pain from coughing, as well as generalized/abdominal pain (see MAR). Patient updated on plan of care, see MAR and flowsheets.

## 2025-01-22 NOTE — PLAN OF CARE
Assumed care of pt at the change of shift. Pt A&Ox4, follows commands. 4LNC. PO BP meds started per vascular. Cardene gtt weaned off in AM. Pt maintaining in BP parameters. Febrile this AM- tylenol given. See eMAR. Abdomen soft.  Bilateral groin sites soft, no bleeding, or hematoma. Pt ambulating in room and tolerating being up in chair. Complaints of pain while coughing and some generalized pain- see eMAR. Pt updated on the POC. See flowsheets for further information.

## 2025-01-22 NOTE — PROGRESS NOTES
[facility]   part of [facility]     Nephrology Progress Note    Christian Hernandez Patient Status:  Inpatient    1985 MRN [id number]   Location [facility] 4SW-A Attending Dakota Gómez MD   Hosp Day # 3 PCP OREN GALAVIZ MD       SUBJECTIVE:  Stable overnight, notes chest and abd pain        Physical Exam:   BP (!) 172/92 (BP Location: Left arm)   Pulse 91   Temp 99.8 °F (37.7 °C) (Temporal)   Resp 19   Wt 183 lb 10.3 oz (83.3 kg)   SpO2 97%   BMI 24.91 kg/m²   Temp (24hrs), Av.2 °F (37.9 °C), Min:99.3 °F (37.4 °C), Max:100.9 °F (38.3 °C)       Intake/Output Summary (Last 24 hours) at 2025 0825  Last data filed at 2025 0550  Gross per 24 hour   Intake 684.52 ml   Output --   Net 684.52 ml     Last 3 Weights   25 0330 183 lb 10.3 oz (83.3 kg)   25 0000 184 lb 11.9 oz (83.8 kg)   25 0015 185 lb 6.5 oz (84.1 kg)   25 0300 179 lb 14.3 oz (81.6 kg)   25 2002 201 lb 15.1 oz (91.6 kg)   25 1658 177 lb (80.3 kg)   19 0317 214 lb (97.1 kg)     General: Alert and oriented in no apparent distress.  HEENT: No scleral icterus, MMM  Neck: Supple, no CJ or thyromegaly  Cardiac: Regular rate and rhythm, S1, S2 normal, no murmur or rub  Lungs: Clear without wheezes, rales, rhonchi.    Abdomen: Soft, non-tender. + bowel sounds, no palpable organomegaly  Extremities: Without clubbing, cyanosis or edema.  Neurologic:  moving all extremities  Skin: Warm and dry, no rash        Labs:     Recent Labs   Lab 25  0416 25  0444 25  0534 25  0508 25  0624   WBC 11.1* 19.6* 17.2* 16.2* 14.3*   HGB 8.6* 5.5* 7.8* 7.7* 7.7*   MCV 93.3 93.1 92.5 92.6 91.8   .0 167.0 154.0 139.0* 160.0   INR 1.28*  --   --   --   --        Recent Labs   Lab 25  1709 25  0416 25  0444 25  0508 25  0624    137 134* 134* 134*   K 5.1 5.8* 4.9 4.4 4.7    100 101 98 97*   CO2 29.0 26.0 27.0 27.0  24.0   BUN 48* 51* 30* 29* 45*   CREATSERUM 10.37* 10.96* 7.94* 7.26* 9.99*   CA 8.6* 8.2* 8.5* 9.0 8.7   MG  --  2.2 2.0  --   --    PHOS  --  7.0* 6.1*  --   --    * 110* 107* 74 75       Recent Labs   Lab 01/18/25  1709 01/19/25  0416 01/20/25  0444 01/21/25  0508   ALT 7* <7* <7* <7*   AST 10 11 11 18   ALB 3.9 3.9 3.3 3.6       Recent Labs   Lab 01/18/25  1708 01/19/25  0035   PGLU 102* 104*       Meds:   Current Facility-Administered Medications   Medication Dose Route Frequency    sodium chloride 0.9% infusion   Intravenous Once    hydrALAZINE (Apresoline) tab 100 mg  100 mg Oral TID    sodium chloride 0.9 % IV bolus 100 mL  100 mL Intravenous Q30 Min PRN    And    albumin human (Albumin) 25% injection 25 g  25 g Intravenous PRN Dialysis    epoetin carl (Epogen, Procrit) 03283 UNIT/ML injection 10,000 Units  10,000 Units Intravenous Once in dialysis    acetaminophen (Tylenol Extra Strength) tab 1,000 mg  1,000 mg Oral Q6H PRN    carvedilol (Coreg) tab 25 mg  25 mg Oral BID with meals    acetaminophen (Ofirmev) 10 mg/mL infusion premix 1,000 mg  1,000 mg Intravenous Q6H PRN    piperacillin-tazobactam (Zosyn) 3.375 g in dextrose 5% 100 mL IVPB-ADDV  3.375 g Intravenous Q12H    prochlorperazine (Compazine) 10 MG/2ML injection 5 mg  5 mg Intravenous Q8H PRN    HYDROmorphone (Dilaudid) 1 MG/ML injection 0.2 mg  0.2 mg Intravenous Q2H PRN    Or    HYDROmorphone (Dilaudid) 1 MG/ML injection 0.4 mg  0.4 mg Intravenous Q2H PRN    Or    HYDROmorphone (Dilaudid) 1 MG/ML injection 0.8 mg  0.8 mg Intravenous Q2H PRN    lamoTRIgine (LaMICtal) tab 150 mg  150 mg Oral BID    niCARdipine in sodium chloride 0.86% (carDENE) 20 mg/200mL infusion premix  5-15 mg/hr Intravenous Continuous    hydrALAzine (Apresoline) 20 mg/mL injection 10 mg  10 mg Intravenous Q4H PRN    melatonin tab 3 mg  3 mg Oral Nightly PRN    polyethylene glycol (PEG 3350) (Miralax) 17 g oral packet 17 g  17 g Oral Daily PRN    sennosides (Senokot) tab  17.2 mg  17.2 mg Oral Nightly PRN    bisacodyl (Dulcolax) 10 MG rectal suppository 10 mg  10 mg Rectal Daily PRN    ondansetron (Zofran) 4 MG/2ML injection 4 mg  4 mg Intravenous Q6H PRN    amLODIPine (Norvasc) tab 10 mg  10 mg Oral Daily    heparin (Porcine) 1000 UNIT/ML injection 1,500 Units  1.5 mL Intracatheter PRN Dialysis         Impression/Plan:      1) ESRD: etiology not clear but now with failed kidney transplant (performed in 2014, on HD x 3 years). Receives HD MWF as outpatient and will cont.      2) Descending aortic dissection with thrombus -> endovascular repair / PTA + stenting AO / bilat iliacs 1/19 by Dr. 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  BP still suboptimally controlled.  Add lisinopril     5) Anemia- due to CKD / post-op; EPO with HD     6) Seizure disorder     7) Acute hypoxic respiratory failure: 2/2 pulmonary edema and also c/f possible multifocal PNA. Volume removal with HD, on Zosyn per ICU    Questions/concerns were discussed with patient and/or family by bedside.          Marc Putnam MD  1/22/2025  8:25 AM

## 2025-01-22 NOTE — PROGRESS NOTES
[facility]  Pulmonary/Critical Care Progress note    Christian Hernandez Patient Status:  Inpatient    1985 MRN [id number]   [facility] 4SW-A Attending Dakota Gómez MD   Hosp Day # 3 PCP OREN GALAVIZ MD       History of Present Illness:  Cardene drip weaned off.  Currently on all oral medications to control blood pressure.  Temperature downward trend Tmax 99.8 °F t hemoglobin declined so 1 unit PRBC given.  Patient complains of mild abdominal pain.  Being treated with Dilaudid    History:  Past Medical History:    Essential hypertension    Seizure disorder (HCC)    Stroke (HCC)     Past Surgical History:   Procedure Laterality Date    Transplantation of kidney  2017     No family history on file.   reports that he has never smoked. He has never used smokeless tobacco.    Allergies:  Allergies[1]    Medications:    Current Facility-Administered Medications:     sodium chloride 0.9% infusion, , Intravenous, Once    hydrALAZINE (Apresoline) tab 100 mg, 100 mg, Oral, TID    sodium chloride 0.9 % IV bolus 100 mL, 100 mL, Intravenous, Q30 Min PRN **AND** albumin human (Albumin) 25% injection 25 g, 25 g, Intravenous, PRN Dialysis    epoetin carl (Epogen, Procrit) 09655 UNIT/ML injection 10,000 Units, 10,000 Units, Intravenous, Once in dialysis    acetaminophen (Tylenol Extra Strength) tab 1,000 mg, 1,000 mg, Oral, Q6H PRN    carvedilol (Coreg) tab 25 mg, 25 mg, Oral, BID with meals    acetaminophen (Ofirmev) 10 mg/mL infusion premix 1,000 mg, 1,000 mg, Intravenous, Q6H PRN    piperacillin-tazobactam (Zosyn) 3.375 g in dextrose 5% 100 mL IVPB-ADDV, 3.375 g, Intravenous, Q12H    prochlorperazine (Compazine) 10 MG/2ML injection 5 mg, 5 mg, Intravenous, Q8H PRN    HYDROmorphone (Dilaudid) 1 MG/ML injection 0.2 mg, 0.2 mg, Intravenous, Q2H PRN **OR** HYDROmorphone (Dilaudid) 1 MG/ML injection 0.4 mg, 0.4 mg, Intravenous, Q2H PRN **OR** HYDROmorphone (Dilaudid) 1 MG/ML injection 0.8 mg,  0.8 mg, Intravenous, Q2H PRN    lamoTRIgine (LaMICtal) tab 150 mg, 150 mg, Oral, BID    niCARdipine in sodium chloride 0.86% (carDENE) 20 mg/200mL infusion premix, 5-15 mg/hr, Intravenous, Continuous    hydrALAzine (Apresoline) 20 mg/mL injection 10 mg, 10 mg, Intravenous, Q4H PRN    melatonin tab 3 mg, 3 mg, Oral, Nightly PRN    polyethylene glycol (PEG 3350) (Miralax) 17 g oral packet 17 g, 17 g, Oral, Daily PRN    sennosides (Senokot) tab 17.2 mg, 17.2 mg, Oral, Nightly PRN    bisacodyl (Dulcolax) 10 MG rectal suppository 10 mg, 10 mg, Rectal, Daily PRN    ondansetron (Zofran) 4 MG/2ML injection 4 mg, 4 mg, Intravenous, Q6H PRN    amLODIPine (Norvasc) tab 10 mg, 10 mg, Oral, Daily    heparin (Porcine) 1000 UNIT/ML injection 1,500 Units, 1.5 mL, Intracatheter, PRN Dialysis    Intake/Output:    Intake/Output Summary (Last 24 hours) at 1/22/2025 0749  Last data filed at 1/22/2025 0550  Gross per 24 hour   Intake 744.52 ml   Output --   Net 744.52 ml      Body mass index is 24.91 kg/m².    Review of Systems  Review of Systems:   A comprehensive 10 point review of systems was completed.  Pertinent positives and negatives noted in the the HPI.            Vitals:    01/22/25 0330 01/22/25 0400 01/22/25 0430 01/22/25 0500   BP:  (!) 171/85  (!) 171/80   BP Location:  Left arm     Pulse:  96  95   Resp:  16  18   Temp:  (!) 100.6 °F (38.1 °C)     TempSrc:  Temporal     SpO2:  99% 100% 94%   Weight: 183 lb 10.3 oz (83.3 kg)            Physical Exam  Constitutional:       General: He is not in acute distress.     Appearance: Normal appearance. He is not ill-appearing or diaphoretic.   HENT:      Head: Normocephalic and atraumatic.      Nose: Nose normal. No congestion or rhinorrhea.      Mouth/Throat:      Mouth: Mucous membranes are moist.      Pharynx: Oropharynx is clear. No oropharyngeal exudate or posterior oropharyngeal erythema.      Comments: Mallampati class IV palate   Eyes:      Extraocular Movements: Extraocular  movements intact.      Pupils: Pupils are equal, round, and reactive to light.   Cardiovascular:      Rate and Rhythm: Normal rate.      Pulses: Normal pulses.      Heart sounds: Normal heart sounds. No murmur heard.  Pulmonary:      Effort: Pulmonary effort is normal. No respiratory distress.      Breath sounds: Normal breath sounds. No wheezing or rhonchi.   Chest:      Chest wall: No tenderness.   Abdominal:      Palpations: Abdomen is soft.   Musculoskeletal:         General: Normal range of motion.   Skin:     General: Skin is warm.   Neurological:      General: No focal deficit present.      Mental Status: He is alert and oriented to person, place, and time.   Psychiatric:         Mood and Affect: Mood normal.         Behavior: Behavior normal.         Thought Content: Thought content normal.         Judgment: Judgment normal.            Lab Data Review:  Recent Labs   Lab 01/20/25  0534 01/21/25  0508 01/22/25  0624   WBC 17.2* 16.2* 14.3*   HGB 7.8* 7.7* 7.7*   HCT 23.5* 23.8* 23.5*   .0 139.0* 160.0       Recent Labs   Lab 01/19/25  0416 01/20/25  0444 01/21/25  0508 01/22/25  0624    134* 134* 134*   K 5.8* 4.9 4.4 4.7    101 98 97*   CO2 26.0 27.0 27.0 24.0   BUN 51* 30* 29* 45*   CREATSERUM 10.96* 7.94* 7.26* 9.99*   CA 8.2* 8.5* 9.0 8.7   ALB 3.9 3.3 3.6  --    ALKPHO 68 58 69  --    ALT <7* <7* <7*  --    AST 11 11 18  --    * 107* 74 75       Recent Labs   Lab 01/19/25 0416 01/20/25 0444   MG 2.2 2.0       Lab Results   Component Value Date    PHOS 6.1 (H) 01/20/2025        Recent Labs   Lab 01/18/25  1709 01/19/25 0416   INR  --  1.28*   PTT 30.0  --        No results for input(s): \"ABGPHT\", \"IZHTRI7C\", \"HEKJC3Z\", \"ABGHCO3\", \"ABGBE\", \"TEMP\", \"JOSE L\", \"SITE\", \"DEV\", \"THGB\" in the last 168 hours.    No results for input(s): \"TROP\", \"CKMB\" in the last 168 hours.    Cultures:   Hospital Encounter on 01/19/25   1. Blood Culture     Status: None (Preliminary result)     Collection Time: 01/20/25  5:04 AM    Specimen: Blood,peripheral   Result Value Ref Range    Blood Culture Result No Growth 1 Day N/A           Radiology personally reviewed:  XR CHEST AP PORTABLE  (CPT=71045)    Result Date: 1/21/2025  CONCLUSION:  There are persistent areas of ground-glass opacity and atelectasis/consolidation in the lungs, again most pronounced in the mid to lower left lung and medial right lower lung that is concerning for multifocal pneumonia, pulmonary edema, with  other etiologies not entirely excluded.  Clinical correlation and continued follow-up is suggested.   LOCATION:  [facility]      Dictated by (CST): Cj Lance MD on 1/21/2025 at 7:06 AM     Finalized by (CST): Cj Lance MD on 1/21/2025 at 7:07 AM         Patient Active Problem List   Diagnosis    Aortic dissection (HCC)    Dissection of aorta, unspecified portion of aorta (HCC)    Primary hypertension    Dissection of aorta, thoracoabdominal (HCC)    Descending thoracic aortic dissection (HCC)    ESRD (end stage renal disease) (HCC)    Anemia in ESRD (end-stage renal disease) (HCC)    Acute hypoxic respiratory failure (HCC)     38 y/o with h/o HTN, ESRD on HD and SZR d/o who was admitted to [facility] with c/o LLE pain along with lower back pain. Denies recent MVA/trauma/falls. Has h/o similar pain in the past but not as severe and did not seek medical w/u. In ER, pt underwent w/u including CTA C/A/P which revealed a large descending aortic dissection ([place] type B) along with aortic thrombus and pulmonary edema. Was evaluated by PV surgery and required emergent intervention.     Assessment/plan:      Pulmonary:  Acute hypoxic resp failure suspect due to pulmonary edema with volume overload- extubated in PACU : Weaned to room air  Bibasilar atelectasis/infiltrate: Chest x-ray shows: persistent areas of ground-glass opacity and   atelectasis/consolidation in the lungs, again most pronounced in the mid to lower left  lung and medial right lower lung that is concerning for multifocal pneumonia,       Plan:  NIPPV as needed for increased work of breathing  Titrate FiO2 to keep oxygen saturation between 90% to 92%.    Cardiovascular:  HTN emergency - with associated pulmonary edema and dissection of descending aorta  Descending aortic dissection ([place] type B)      Plan:  - s/p endovascular repair, plasty, stenting of aorta, bilateral iliac arteries (1/19)  - PV surgery managing closely  -Continue BP control with oral medications  - analgesic support  - tight BP control with goal -180  Continue home antihypertensive medications    Hematologic/coagulation  Anemia: Suspect due to CKD and postop on Epogen with hemodialysis    Plan:  Continue to monitor hemoglobin and transfuse PRBC for hemoglobin less than 7 g    Renal/FEN  Hypertension   ESRD on HD, failed renal transplant 2017      Plan:  F/E/N- minimize IVFs, lytes per protocol, ADAT  - MWF HD;   -Renal consult appreciated   Nephrology to adjust antihypertensive medications in collaboration with vascular surgery      Endocrine/DEM  Blood sugars controlled    Plan:  Monitor    Gastroenterology/hepatology   Tolerating oral renal diet  No BM yestr     Plan:  Continue to monitor symptoms    Infectious disease:  Fever: Downward trend and improvement    Plan:  Check blood blood cultures negative so far  Zosyn IVPB empiric for presumed gram-negative pneumonia  Follow-up labs and chest x-ray in a.m.    Neurologic/psychiatry  Neuro- h/o CVA and subsequent seizure disorder clinically stable      Plan:  - no signs of active seizure  -lamictal  - monitor for seizures    Musculoskeletal/rheumatology  No evidence of musculoskeletal disease  Pain     Plan:  tramadol as needed     Prophylaxis:    DVT prophylaxis: SCD boots   GI prophylaxis: ---    Lines:  PIV, right subclavian HD line   Catheters:Anuric   Feeding: renal diet     Disposition:  ICU monitoring    CODE STATUS: Full code             Raza Gomez MD    35  minutes of critical care time were spent at the bedside performing history, physical, complex data interpretation, radiographic interpretation, discussion with consultants, and creating a diagnostic and therapeutic treatment plan for this critically ill patient who is at risk for clinical deterioration. Time spent was excluding time any billable procedures      Note to the patient: The 21st Century Cures Act makes medical notes like these available to patients in the interest of transparency. However, be advised that this is a medical document. It is intended as peer to peer communication. It is written in medical language and may contain abbreviations or verbiage that are unfamiliar. It may appear blunt or direct. Medical documents are intended to carry relevant information, facts as evident, and clinical opinion of the practitioner.      Disclaimer: Components of this note were documented using voice recognition system and are subject to errors not corrected at proofreading. Contact the author of this note for any clarifications          [1] No Known Allergies

## 2025-01-22 NOTE — PROGRESS NOTES
[facility]   part of [facility]     Vascular Surgery Progress Note    Christian Hernandez Patient Status:  Inpatient    1985 MRN [id number]   Location [facility] 4SW-A Attending Dakota Gómez MD   Hosp Day # 3 PCP OREN GALAVIZ MD     Objective:   Temp: 100.6 °F (38.1 °C)  Pulse: 95  Resp: 18  BP: 171/80      Events Yesterday/Overnight:  No events overnight.Temperature at 0400: 100.6, /80. Patient's BP maintained on oral medication, off cardene. Patient's WBC trending down at 14.3.       Exam:  Bilateral groin sites soft, no hematoma, Notes pain in the left groin. No s/s of infection. Palpable bilateral DP and PT pulses. Neurologically intact.     Impression/Plan:    1 unit for PRBC to maintain spinal cord perfusion.    Planning for HD today per nephrology.    Patient can ambulate as tolerated. Up in the chair.    Continue with PT.    Maintain BP at 120-160 with a goal of 140 systolic.       Medications:  Current Facility-Administered Medications   Medication Dose Route Frequency    sodium chloride 0.9% infusion   Intravenous Once    hydrALAZINE (Apresoline) tab 100 mg  100 mg Oral TID    sodium chloride 0.9 % IV bolus 100 mL  100 mL Intravenous Q30 Min PRN    And    albumin human (Albumin) 25% injection 25 g  25 g Intravenous PRN Dialysis    epoetin carl (Epogen, Procrit) 34275 UNIT/ML injection 10,000 Units  10,000 Units Intravenous Once in dialysis    acetaminophen (Tylenol Extra Strength) tab 1,000 mg  1,000 mg Oral Q6H PRN    carvedilol (Coreg) tab 25 mg  25 mg Oral BID with meals    acetaminophen (Ofirmev) 10 mg/mL infusion premix 1,000 mg  1,000 mg Intravenous Q6H PRN    piperacillin-tazobactam (Zosyn) 3.375 g in dextrose 5% 100 mL IVPB-ADDV  3.375 g Intravenous Q12H    prochlorperazine (Compazine) 10 MG/2ML injection 5 mg  5 mg Intravenous Q8H PRN    HYDROmorphone (Dilaudid) 1 MG/ML injection 0.2 mg  0.2 mg Intravenous Q2H PRN    Or    HYDROmorphone (Dilaudid) 1 MG/ML  injection 0.4 mg  0.4 mg Intravenous Q2H PRN    Or    HYDROmorphone (Dilaudid) 1 MG/ML injection 0.8 mg  0.8 mg Intravenous Q2H PRN    lamoTRIgine (LaMICtal) tab 150 mg  150 mg Oral BID    niCARdipine in sodium chloride 0.86% (carDENE) 20 mg/200mL infusion premix  5-15 mg/hr Intravenous Continuous    hydrALAzine (Apresoline) 20 mg/mL injection 10 mg  10 mg Intravenous Q4H PRN    melatonin tab 3 mg  3 mg Oral Nightly PRN    polyethylene glycol (PEG 3350) (Miralax) 17 g oral packet 17 g  17 g Oral Daily PRN    sennosides (Senokot) tab 17.2 mg  17.2 mg Oral Nightly PRN    bisacodyl (Dulcolax) 10 MG rectal suppository 10 mg  10 mg Rectal Daily PRN    ondansetron (Zofran) 4 MG/2ML injection 4 mg  4 mg Intravenous Q6H PRN    amLODIPine (Norvasc) tab 10 mg  10 mg Oral Daily    heparin (Porcine) 1000 UNIT/ML injection 1,500 Units  1.5 mL Intracatheter PRN Dialysis       Laboratory/Data:    LABS:  Recent Labs   Lab 01/19/25  0416 01/20/25  0444 01/20/25  0534 01/21/25  0508 01/22/25  0624   WBC 11.1* 19.6* 17.2* 16.2* 14.3*   HGB 8.6* 5.5* 7.8* 7.7* 7.7*   MCV 93.3 93.1 92.5 92.6 91.8   .0 167.0 154.0 139.0* 160.0   INR 1.28*  --   --   --   --        Recent Labs   Lab 01/18/25  1709 01/19/25  0416 01/20/25  0444 01/21/25  0508 01/22/25  0624    137 134* 134* 134*   K 5.1 5.8* 4.9 4.4 4.7    100 101 98 97*   CO2 29.0 26.0 27.0 27.0 24.0   BUN 48* 51* 30* 29* 45*   CREATSERUM 10.37* 10.96* 7.94* 7.26* 9.99*   * 110* 107* 74 75   CA 8.6* 8.2* 8.5* 9.0 8.7   MG  --  2.2 2.0  --   --    PHOS  --  7.0* 6.1*  --   --      Recent Labs   Lab 01/18/25 1709 01/19/25  0416   PTP  --  16.1*   INR  --  1.28*   PTT 30.0  --      Recent Labs   Lab 01/18/25 1709 01/19/25  0416 01/20/25  0444 01/21/25  0508   ALT 7* <7* <7* <7*   AST 10 11 11 18   ALB 3.9 3.9 3.3 3.6     No results for input(s): \"TROP\" in the last 168 hours.  No results found for: \"ANAS\", \"STORM\", \"ANASCRN\"  Recent Labs   Lab 01/19/25  0546   HBSAG  Nonreactive       KIP Polanco  1/22/2025  7:58 AM

## 2025-01-22 NOTE — PLAN OF CARE
Assumed care of patient after RN report. Patient alert and oriented x4. On RA vs 2L NC. Encouraged IS use. SR on monitor. HTN, PRNs given see MAR. Anuric. Abdomen soft. Groin sites soft, mild tenderness. Pain control. See MAR. Ambulates in [address]. Neurovascular checks per orders. HD with 3L removed. 1U PRBCs given. See flowsheet for full assessment.

## 2025-01-23 LAB
ANION GAP SERPL CALC-SCNC: 12 MMOL/L (ref 0–18)
BLOOD TYPE BARCODE: 1700
BUN BLD-MCNC: 36 MG/DL (ref 9–23)
CALCIUM BLD-MCNC: 8.9 MG/DL (ref 8.7–10.6)
CHLORIDE SERPL-SCNC: 99 MMOL/L (ref 98–112)
CO2 SERPL-SCNC: 25 MMOL/L (ref 21–32)
CREAT BLD-MCNC: 8.44 MG/DL
EGFRCR SERPLBLD CKD-EPI 2021: 8 ML/MIN/1.73M2 (ref 60–?)
ERYTHROCYTE [DISTWIDTH] IN BLOOD BY AUTOMATED COUNT: 16.9 %
GLUCOSE BLD-MCNC: 91 MG/DL (ref 70–99)
HCT VFR BLD AUTO: 28 %
HGB BLD-MCNC: 9.4 G/DL
MCH RBC QN AUTO: 29.7 PG (ref 26–34)
MCHC RBC AUTO-ENTMCNC: 33.6 G/DL (ref 31–37)
MCV RBC AUTO: 88.3 FL
OSMOLALITY SERPL CALC.SUM OF ELEC: 290 MOSM/KG (ref 275–295)
PLATELET # BLD AUTO: 204 10(3)UL (ref 150–450)
POTASSIUM SERPL-SCNC: 4.1 MMOL/L (ref 3.5–5.1)
RBC # BLD AUTO: 3.17 X10(6)UL
SODIUM SERPL-SCNC: 136 MMOL/L (ref 136–145)
UNIT VOLUME: 350 ML
WBC # BLD AUTO: 12.3 X10(3) UL (ref 4–11)

## 2025-01-23 PROCEDURE — 99232 SBSQ HOSP IP/OBS MODERATE 35: CPT | Performed by: HOSPITALIST

## 2025-01-23 PROCEDURE — 99233 SBSQ HOSP IP/OBS HIGH 50: CPT | Performed by: INTERNAL MEDICINE

## 2025-01-23 RX ORDER — HYDROCODONE BITARTRATE AND ACETAMINOPHEN 5; 325 MG/1; MG/1
2 TABLET ORAL EVERY 4 HOURS PRN
Status: DISCONTINUED | OUTPATIENT
Start: 2025-01-23 | End: 2025-01-25

## 2025-01-23 RX ORDER — HYDROCODONE BITARTRATE AND ACETAMINOPHEN 5; 325 MG/1; MG/1
1 TABLET ORAL EVERY 4 HOURS PRN
Status: DISCONTINUED | OUTPATIENT
Start: 2025-01-23 | End: 2025-01-25

## 2025-01-23 RX ORDER — HYDROMORPHONE HYDROCHLORIDE 1 MG/ML
0.6 INJECTION, SOLUTION INTRAMUSCULAR; INTRAVENOUS; SUBCUTANEOUS
Status: DISCONTINUED | OUTPATIENT
Start: 2025-01-23 | End: 2025-01-25

## 2025-01-23 RX ORDER — ALBUMIN (HUMAN) 12.5 G/50ML
25 SOLUTION INTRAVENOUS
Status: DISCONTINUED | OUTPATIENT
Start: 2025-01-24 | End: 2025-01-25

## 2025-01-23 RX ORDER — LABETALOL HYDROCHLORIDE 5 MG/ML
5 INJECTION, SOLUTION INTRAVENOUS ONCE
Status: DISCONTINUED | OUTPATIENT
Start: 2025-01-23 | End: 2025-01-25

## 2025-01-23 NOTE — PROGRESS NOTES
[facility]   part of [facility]     Nephrology Progress Note    Christian Hernandez Patient Status:  Inpatient    1985 MRN [id number]   Location [facility] 4SW-A Attending Dakota Gómez MD   Hosp Day # 4 PCP OREN GALAVIZ MD       SUBJECTIVE:  Issues with HD catheter yesterday. Reports similar happened on Monday.     Physical Exam:   BP (!) 178/103 (BP Location: Left arm)   Pulse 98   Temp 98.4 °F (36.9 °C) (Oral)   Resp 15   Wt 183 lb 10.3 oz (83.3 kg)   SpO2 91%   BMI 24.91 kg/m²   Temp (24hrs), Av.9 °F (37.2 °C), Min:97.9 °F (36.6 °C), Max:99.8 °F (37.7 °C)       Intake/Output Summary (Last 24 hours) at 2025 1051  Last data filed at 2025 1811  Gross per 24 hour   Intake 726.25 ml   Output 3000 ml   Net -2273.75 ml     Last 3 Weights   25 0330 183 lb 10.3 oz (83.3 kg)   25 0000 184 lb 11.9 oz (83.8 kg)   25 0015 185 lb 6.5 oz (84.1 kg)   25 0300 179 lb 14.3 oz (81.6 kg)   25 2002 201 lb 15.1 oz (91.6 kg)   25 1658 177 lb (80.3 kg)   19 0317 214 lb (97.1 kg)     General: Alert and oriented in no apparent distress.  HEENT: No scleral icterus, MMM  Neck: Supple, no CJ or thyromegaly  Cardiac: Regular rate and rhythm, S1, S2 normal, no murmur   Lungs: Clear without wheezes, rales, rhonchi.    Abdomen: Soft, non-tender. + bowel sounds  Extremities: Without clubbing, cyanosis or edema.  Neurologic:  moving all extremities  Skin: Warm and dry, no rash        Labs:     Recent Labs   Lab 25  0416 25  0444 25  0534 25  0508 25  0624 25  1629 25  0603   WBC 11.1* 19.6* 17.2* 16.2* 14.3*  --  12.3*   HGB 8.6* 5.5* 7.8* 7.7* 7.7* 9.2* 9.4*   MCV 93.3 93.1 92.5 92.6 91.8  --  88.3   .0 167.0 154.0 139.0* 160.0  --  204.0   INR 1.28*  --   --   --   --   --   --        Recent Labs   Lab 25  0416 25  0444 25  0508 25  0624 25  0603    134* 134* 134*  136   K 5.8* 4.9 4.4 4.7 4.1    101 98 97* 99   CO2 26.0 27.0 27.0 24.0 25.0   BUN 51* 30* 29* 45* 36*   CREATSERUM 10.96* 7.94* 7.26* 9.99* 8.44*   CA 8.2* 8.5* 9.0 8.7 8.9   MG 2.2 2.0  --   --   --    PHOS 7.0* 6.1*  --   --   --    * 107* 74 75 91       Recent Labs   Lab 01/18/25  1709 01/19/25  0416 01/20/25  0444 01/21/25  0508   ALT 7* <7* <7* <7*   AST 10 11 11 18   ALB 3.9 3.9 3.3 3.6       Recent Labs   Lab 01/18/25  1708 01/19/25  0035   PGLU 102* 104*       Meds:   Current Facility-Administered Medications   Medication Dose Route Frequency    labetalol (Trandate) 5 mg/mL injection 5 mg  5 mg Intravenous Once    sodium chloride 0.9% infusion   Intravenous Once    lisinopril (Prinivil; Zestril) tab 20 mg  20 mg Oral Daily    sodium chloride (Saline Mist) 0.65 % nasal solution 1 spray  1 spray Each Nare Q3H PRN    HYDROcodone-acetaminophen (Norco) 5-325 MG per tab 1 tablet  1 tablet Oral Q6H PRN    hydrALAZINE (Apresoline) tab 100 mg  100 mg Oral TID    sodium chloride 0.9 % IV bolus 100 mL  100 mL Intravenous Q30 Min PRN    And    albumin human (Albumin) 25% injection 25 g  25 g Intravenous PRN Dialysis    acetaminophen (Tylenol Extra Strength) tab 1,000 mg  1,000 mg Oral Q6H PRN    carvedilol (Coreg) tab 25 mg  25 mg Oral BID with meals    acetaminophen (Ofirmev) 10 mg/mL infusion premix 1,000 mg  1,000 mg Intravenous Q6H PRN    piperacillin-tazobactam (Zosyn) 3.375 g in dextrose 5% 100 mL IVPB-ADDV  3.375 g Intravenous Q12H    prochlorperazine (Compazine) 10 MG/2ML injection 5 mg  5 mg Intravenous Q8H PRN    HYDROmorphone (Dilaudid) 1 MG/ML injection 0.2 mg  0.2 mg Intravenous Q2H PRN    Or    HYDROmorphone (Dilaudid) 1 MG/ML injection 0.4 mg  0.4 mg Intravenous Q2H PRN    Or    HYDROmorphone (Dilaudid) 1 MG/ML injection 0.8 mg  0.8 mg Intravenous Q2H PRN    lamoTRIgine (LaMICtal) tab 150 mg  150 mg Oral BID    hydrALAzine (Apresoline) 20 mg/mL injection 10 mg  10 mg Intravenous Q4H PRN     melatonin tab 3 mg  3 mg Oral Nightly PRN    polyethylene glycol (PEG 3350) (Miralax) 17 g oral packet 17 g  17 g Oral Daily PRN    sennosides (Senokot) tab 17.2 mg  17.2 mg Oral Nightly PRN    bisacodyl (Dulcolax) 10 MG rectal suppository 10 mg  10 mg Rectal Daily PRN    ondansetron (Zofran) 4 MG/2ML injection 4 mg  4 mg Intravenous Q6H PRN    amLODIPine (Norvasc) tab 10 mg  10 mg Oral Daily    heparin (Porcine) 1000 UNIT/ML injection 1,500 Units  1.5 mL Intracatheter PRN Dialysis         Impression/Plan:    1) ESRD: etiology not clear but now with failed kidney transplant (performed in 2014, on HD x 3 years). Receives HD MWF as outpatient and will cont. Will ask IR to exchange cathter     2) Descending aortic dissection with thrombus -> endovascular repair / PTA + stenting AO / bilat iliacs 1/19 by Dr. 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 Volume removal with HD, on Zosyn per primary    Thank you for allowing me to participate in this patient's care. Please feel free to call me with any questions or concerns.     Ashanti Knapp MD  01/23/25

## 2025-01-23 NOTE — PLAN OF CARE
Assumed pt care around 2210  A&Ox4, able to make needs known  Paina managed w/ Norco and Dilaudid  PRN hydralazine given  Call light in reach  Bed in low position

## 2025-01-23 NOTE — PROGRESS NOTES
[facility]   part of [facility]     Vascular Surgery Progress Note    Christian Hernandez Patient Status:  Inpatient    1985 MRN [id number]   Location [facility] 7NE-A Attending Dakota Gómez MD   Hosp Day # 4 PCP OREN GALAVIZ MD     Objective:   Temp: 98.5 °F (36.9 °C)  Pulse: 94  Resp: 10  BP: 166/87      Events Yesterday/Overnight:  No acute events overnight. Patient had HD yesterday, Continues to use incentive spirometer. WBC trending down at 12.3.Hemoglobin at 9.4. /87 this morning. Patient is on zosyn for presumed pneumonia.       Exam:  Groin sites soft, no hematoma present. Notes discomfort in the left groin site. No s/s of infection. Palpable bilateral DP and PT pulses. Neurologically intact.     Impression/Plan:    Continue PT. Ambulate as tolerated.    BP goal .        Medications:  Current Facility-Administered Medications   Medication Dose Route Frequency    labetalol (Trandate) 5 mg/mL injection 5 mg  5 mg Intravenous Once    sodium chloride 0.9% infusion   Intravenous Once    lisinopril (Prinivil; Zestril) tab 20 mg  20 mg Oral Daily    sodium chloride (Saline Mist) 0.65 % nasal solution 1 spray  1 spray Each Nare Q3H PRN    HYDROcodone-acetaminophen (Norco) 5-325 MG per tab 1 tablet  1 tablet Oral Q6H PRN    hydrALAZINE (Apresoline) tab 100 mg  100 mg Oral TID    sodium chloride 0.9 % IV bolus 100 mL  100 mL Intravenous Q30 Min PRN    And    albumin human (Albumin) 25% injection 25 g  25 g Intravenous PRN Dialysis    acetaminophen (Tylenol Extra Strength) tab 1,000 mg  1,000 mg Oral Q6H PRN    carvedilol (Coreg) tab 25 mg  25 mg Oral BID with meals    acetaminophen (Ofirmev) 10 mg/mL infusion premix 1,000 mg  1,000 mg Intravenous Q6H PRN    piperacillin-tazobactam (Zosyn) 3.375 g in dextrose 5% 100 mL IVPB-ADDV  3.375 g Intravenous Q12H    prochlorperazine (Compazine) 10 MG/2ML injection 5 mg  5 mg Intravenous Q8H PRN    HYDROmorphone (Dilaudid) 1 MG/ML  injection 0.2 mg  0.2 mg Intravenous Q2H PRN    Or    HYDROmorphone (Dilaudid) 1 MG/ML injection 0.4 mg  0.4 mg Intravenous Q2H PRN    Or    HYDROmorphone (Dilaudid) 1 MG/ML injection 0.8 mg  0.8 mg Intravenous Q2H PRN    lamoTRIgine (LaMICtal) tab 150 mg  150 mg Oral BID    hydrALAzine (Apresoline) 20 mg/mL injection 10 mg  10 mg Intravenous Q4H PRN    melatonin tab 3 mg  3 mg Oral Nightly PRN    polyethylene glycol (PEG 3350) (Miralax) 17 g oral packet 17 g  17 g Oral Daily PRN    sennosides (Senokot) tab 17.2 mg  17.2 mg Oral Nightly PRN    bisacodyl (Dulcolax) 10 MG rectal suppository 10 mg  10 mg Rectal Daily PRN    ondansetron (Zofran) 4 MG/2ML injection 4 mg  4 mg Intravenous Q6H PRN    amLODIPine (Norvasc) tab 10 mg  10 mg Oral Daily    heparin (Porcine) 1000 UNIT/ML injection 1,500 Units  1.5 mL Intracatheter PRN Dialysis       Laboratory/Data:    LABS:  Recent Labs   Lab 01/19/25 0416 01/20/25 0444 01/20/25  0534 01/21/25  0508 01/22/25  0624 01/22/25  1629 01/23/25  0603   WBC 11.1* 19.6* 17.2* 16.2* 14.3*  --  12.3*   HGB 8.6* 5.5* 7.8* 7.7* 7.7* 9.2* 9.4*   MCV 93.3 93.1 92.5 92.6 91.8  --  88.3   .0 167.0 154.0 139.0* 160.0  --  204.0   INR 1.28*  --   --   --   --   --   --        Recent Labs   Lab 01/19/25 0416 01/20/25 0444 01/21/25  0508 01/22/25  0624 01/23/25  0603    134* 134* 134* 136   K 5.8* 4.9 4.4 4.7 4.1    101 98 97* 99   CO2 26.0 27.0 27.0 24.0 25.0   BUN 51* 30* 29* 45* 36*   CREATSERUM 10.96* 7.94* 7.26* 9.99* 8.44*   * 107* 74 75 91   CA 8.2* 8.5* 9.0 8.7 8.9   MG 2.2 2.0  --   --   --    PHOS 7.0* 6.1*  --   --   --      Recent Labs   Lab 01/18/25 1709 01/19/25  0416   PTP  --  16.1*   INR  --  1.28*   PTT 30.0  --      Recent Labs   Lab 01/18/25 1709 01/19/25  0416 01/20/25  0444 01/21/25  0508   ALT 7* <7* <7* <7*   AST 10 11 11 18   ALB 3.9 3.9 3.3 3.6     No results for input(s): \"TROP\" in the last 168 hours.  No results found for: \"ANAS\", \"STORM\",  \"ANACRYSTALN\"  Recent Labs   Lab 01/19/25  0546   HBSAG Nonreactive       KIP Polanco  1/23/2025  7:55 AM

## 2025-01-24 ENCOUNTER — APPOINTMENT (OUTPATIENT)
Dept: INTERVENTIONAL RADIOLOGY/VASCULAR | Facility: HOSPITAL | Age: 40
End: 2025-01-24
Attending: INTERNAL MEDICINE
Payer: MEDICAID

## 2025-01-24 LAB
ANION GAP SERPL CALC-SCNC: 14 MMOL/L (ref 0–18)
BUN BLD-MCNC: 45 MG/DL (ref 9–23)
CALCIUM BLD-MCNC: 8.9 MG/DL (ref 8.7–10.6)
CHLORIDE SERPL-SCNC: 98 MMOL/L (ref 98–112)
CO2 SERPL-SCNC: 25 MMOL/L (ref 21–32)
CREAT BLD-MCNC: 10.63 MG/DL
EGFRCR SERPLBLD CKD-EPI 2021: 6 ML/MIN/1.73M2 (ref 60–?)
ERYTHROCYTE [DISTWIDTH] IN BLOOD BY AUTOMATED COUNT: 16.8 %
GLUCOSE BLD-MCNC: 82 MG/DL (ref 70–99)
HCT VFR BLD AUTO: 27.3 %
HGB BLD-MCNC: 9.3 G/DL
MCH RBC QN AUTO: 30 PG (ref 26–34)
MCHC RBC AUTO-ENTMCNC: 34.1 G/DL (ref 31–37)
MCV RBC AUTO: 88.1 FL
OSMOLALITY SERPL CALC.SUM OF ELEC: 295 MOSM/KG (ref 275–295)
PLATELET # BLD AUTO: 256 10(3)UL (ref 150–450)
POTASSIUM SERPL-SCNC: 3.9 MMOL/L (ref 3.5–5.1)
RBC # BLD AUTO: 3.1 X10(6)UL
SODIUM SERPL-SCNC: 137 MMOL/L (ref 136–145)
WBC # BLD AUTO: 10.7 X10(3) UL (ref 4–11)

## 2025-01-24 PROCEDURE — 99232 SBSQ HOSP IP/OBS MODERATE 35: CPT | Performed by: HOSPITALIST

## 2025-01-24 PROCEDURE — 06H033Z INSERTION OF INFUSION DEVICE INTO INFERIOR VENA CAVA, PERCUTANEOUS APPROACH: ICD-10-PCS | Performed by: STUDENT IN AN ORGANIZED HEALTH CARE EDUCATION/TRAINING PROGRAM

## 2025-01-24 PROCEDURE — 02PY33Z REMOVAL OF INFUSION DEVICE FROM GREAT VESSEL, PERCUTANEOUS APPROACH: ICD-10-PCS | Performed by: STUDENT IN AN ORGANIZED HEALTH CARE EDUCATION/TRAINING PROGRAM

## 2025-01-24 PROCEDURE — 90935 HEMODIALYSIS ONE EVALUATION: CPT | Performed by: INTERNAL MEDICINE

## 2025-01-24 PROCEDURE — B519ZZA FLUOROSCOPY OF INFERIOR VENA CAVA, GUIDANCE: ICD-10-PCS | Performed by: STUDENT IN AN ORGANIZED HEALTH CARE EDUCATION/TRAINING PROGRAM

## 2025-01-24 RX ORDER — CEFUROXIME AXETIL 250 MG/1
250 TABLET ORAL DAILY
Status: DISCONTINUED | OUTPATIENT
Start: 2025-01-24 | End: 2025-01-25

## 2025-01-24 RX ORDER — VALSARTAN 320 MG/1
320 TABLET ORAL DAILY
Status: DISCONTINUED | OUTPATIENT
Start: 2025-01-24 | End: 2025-01-25

## 2025-01-24 RX ORDER — HEPARIN SODIUM 5000 [USP'U]/ML
INJECTION, SOLUTION INTRAVENOUS; SUBCUTANEOUS
Status: COMPLETED
Start: 2025-01-24 | End: 2025-01-24

## 2025-01-24 RX ORDER — LIDOCAINE HYDROCHLORIDE AND EPINEPHRINE BITARTRATE 20; .01 MG/ML; MG/ML
INJECTION, SOLUTION SUBCUTANEOUS
Status: COMPLETED
Start: 2025-01-24 | End: 2025-01-24

## 2025-01-24 RX ORDER — MIDAZOLAM HYDROCHLORIDE 1 MG/ML
INJECTION INTRAMUSCULAR; INTRAVENOUS
Status: COMPLETED
Start: 2025-01-24 | End: 2025-01-24

## 2025-01-24 RX ORDER — LIDOCAINE HYDROCHLORIDE 10 MG/ML
INJECTION, SOLUTION INFILTRATION; PERINEURAL
Status: COMPLETED
Start: 2025-01-24 | End: 2025-01-24

## 2025-01-24 NOTE — PROGRESS NOTES
[facility]   part of [facility]     Nephrology Progress Note    Christian Hernandez Patient Status:  Inpatient    1985 MRN [id number]   Location [facility] 4SW-A Attending Dakota Gómez MD   Hosp Day # 5 PCP OREN GALAVIZ MD       SUBJECTIVE:    S/p HD cath placement/exchange today  No complains  HD today    Current Facility-Administered Medications   Medication Dose Route Frequency    valsartan (Diovan) tab 320 mg  320 mg Oral Daily    cefuroxime (Ceftin) tab 250 mg  250 mg Oral Daily    labetalol (Trandate) 5 mg/mL injection 5 mg  5 mg Intravenous Once    sodium chloride 0.9 % IV bolus 100 mL  100 mL Intravenous Q30 Min PRN    And    albumin human (Albumin) 25% injection 25 g  25 g Intravenous PRN Dialysis    epoetin carl (Epogen, Procrit) 15258 UNIT/ML injection 10,000 Units  10,000 Units Intravenous Once in dialysis    HYDROmorphone (Dilaudid) 1 MG/ML injection 0.6 mg  0.6 mg Intravenous Q3H PRN    HYDROcodone-acetaminophen (Norco) 5-325 MG per tab 1 tablet  1 tablet Oral Q4H PRN    HYDROcodone-acetaminophen (Norco) 5-325 MG per tab 2 tablet  2 tablet Oral Q4H PRN    sodium chloride 0.9% infusion   Intravenous Once    sodium chloride (Saline Mist) 0.65 % nasal solution 1 spray  1 spray Each Nare Q3H PRN    hydrALAZINE (Apresoline) tab 100 mg  100 mg Oral TID    acetaminophen (Tylenol Extra Strength) tab 1,000 mg  1,000 mg Oral Q6H PRN    carvedilol (Coreg) tab 25 mg  25 mg Oral BID with meals    acetaminophen (Ofirmev) 10 mg/mL infusion premix 1,000 mg  1,000 mg Intravenous Q6H PRN    prochlorperazine (Compazine) 10 MG/2ML injection 5 mg  5 mg Intravenous Q8H PRN    lamoTRIgine (LaMICtal) tab 150 mg  150 mg Oral BID    hydrALAzine (Apresoline) 20 mg/mL injection 10 mg  10 mg Intravenous Q4H PRN    melatonin tab 3 mg  3 mg Oral Nightly PRN    polyethylene glycol (PEG 3350) (Miralax) 17 g oral packet 17 g  17 g Oral Daily PRN    sennosides (Senokot) tab 17.2 mg  17.2 mg Oral  Nightly PRN    bisacodyl (Dulcolax) 10 MG rectal suppository 10 mg  10 mg Rectal Daily PRN    ondansetron (Zofran) 4 MG/2ML injection 4 mg  4 mg Intravenous Q6H PRN    amLODIPine (Norvasc) tab 10 mg  10 mg Oral Daily    heparin (Porcine) 1000 UNIT/ML injection 1,500 Units  1.5 mL Intracatheter PRN Dialysis         Physical Exam:   BP (!) 157/97 (BP Location: Left arm)   Pulse 88   Temp 99 °F (37.2 °C) (Oral)   Resp 15   Wt 183 lb 10.3 oz (83.3 kg)   SpO2 100%   BMI 24.91 kg/m²   Temp (24hrs), Av.9 °F (37.2 °C), Min:98 °F (36.7 °C), Max:99.6 °F (37.6 °C)     No intake or output data in the 24 hours ending 25 1519    Last 3 Weights   25 0330 183 lb 10.3 oz (83.3 kg)   25 0000 184 lb 11.9 oz (83.8 kg)   25 0015 185 lb 6.5 oz (84.1 kg)   25 0300 179 lb 14.3 oz (81.6 kg)   25 2002 201 lb 15.1 oz (91.6 kg)   25 1658 177 lb (80.3 kg)   19 0317 214 lb (97.1 kg)     General: Alert and oriented in no apparent distress.  HEENT: No scleral icterus, MMM  Neck: Supple, no CJ or thyromegaly  Cardiac: Regular rate and rhythm, S1, S2 normal, no murmur   Lungs: Clear without wheezes, rales, rhonchi.    Abdomen: Soft, non-tender. + bowel sounds  Extremities: Without clubbing, cyanosis or edema.  Neurologic:  moving all extremities  Skin: Warm and dry, no rash        Labs:     Recent Labs   Lab 25  0416 25  0444 25  0534 25  0508 25  0624 25  1629 25  0603 25  0535   WBC 11.1*   < > 17.2* 16.2* 14.3*  --  12.3* 10.7   HGB 8.6*   < > 7.8* 7.7* 7.7* 9.2* 9.4* 9.3*   MCV 93.3   < > 92.5 92.6 91.8  --  88.3 88.1   .0   < > 154.0 139.0* 160.0  --  204.0 256.0   INR 1.28*  --   --   --   --   --   --   --     < > = values in this interval not displayed.       Recent Labs   Lab 25  0416 25  0444 25  0508 25  0624 25  0603 25  0535    134* 134* 134* 136 137   K 5.8* 4.9 4.4 4.7 4.1 3.9   CL  100 101 98 97* 99 98   CO2 26.0 27.0 27.0 24.0 25.0 25.0   BUN 51* 30* 29* 45* 36* 45*   CREATSERUM 10.96* 7.94* 7.26* 9.99* 8.44* 10.63*   CA 8.2* 8.5* 9.0 8.7 8.9 8.9   MG 2.2 2.0  --   --   --   --    PHOS 7.0* 6.1*  --   --   --   --    * 107* 74 75 91 82       Recent Labs   Lab 01/18/25  1709 01/19/25  0416 01/20/25  0444 01/21/25  0508   ALT 7* <7* <7* <7*   AST 10 11 11 18   ALB 3.9 3.9 3.3 3.6       Recent Labs   Lab 01/18/25  1708 01/19/25  0035   PGLU 102* 104*       Meds:   Current Facility-Administered Medications   Medication Dose Route Frequency    valsartan (Diovan) tab 320 mg  320 mg Oral Daily    cefuroxime (Ceftin) tab 250 mg  250 mg Oral Daily    labetalol (Trandate) 5 mg/mL injection 5 mg  5 mg Intravenous Once    sodium chloride 0.9 % IV bolus 100 mL  100 mL Intravenous Q30 Min PRN    And    albumin human (Albumin) 25% injection 25 g  25 g Intravenous PRN Dialysis    epoetin carl (Epogen, Procrit) 11521 UNIT/ML injection 10,000 Units  10,000 Units Intravenous Once in dialysis    HYDROmorphone (Dilaudid) 1 MG/ML injection 0.6 mg  0.6 mg Intravenous Q3H PRN    HYDROcodone-acetaminophen (Norco) 5-325 MG per tab 1 tablet  1 tablet Oral Q4H PRN    HYDROcodone-acetaminophen (Norco) 5-325 MG per tab 2 tablet  2 tablet Oral Q4H PRN    sodium chloride 0.9% infusion   Intravenous Once    sodium chloride (Saline Mist) 0.65 % nasal solution 1 spray  1 spray Each Nare Q3H PRN    hydrALAZINE (Apresoline) tab 100 mg  100 mg Oral TID    acetaminophen (Tylenol Extra Strength) tab 1,000 mg  1,000 mg Oral Q6H PRN    carvedilol (Coreg) tab 25 mg  25 mg Oral BID with meals    acetaminophen (Ofirmev) 10 mg/mL infusion premix 1,000 mg  1,000 mg Intravenous Q6H PRN    prochlorperazine (Compazine) 10 MG/2ML injection 5 mg  5 mg Intravenous Q8H PRN    lamoTRIgine (LaMICtal) tab 150 mg  150 mg Oral BID    hydrALAzine (Apresoline) 20 mg/mL injection 10 mg  10 mg Intravenous Q4H PRN    melatonin tab 3 mg  3 mg Oral  Nightly PRN    polyethylene glycol (PEG 3350) (Miralax) 17 g oral packet 17 g  17 g Oral Daily PRN    sennosides (Senokot) tab 17.2 mg  17.2 mg Oral Nightly PRN    bisacodyl (Dulcolax) 10 MG rectal suppository 10 mg  10 mg Rectal Daily PRN    ondansetron (Zofran) 4 MG/2ML injection 4 mg  4 mg Intravenous Q6H PRN    amLODIPine (Norvasc) tab 10 mg  10 mg Oral Daily    heparin (Porcine) 1000 UNIT/ML injection 1,500 Units  1.5 mL Intracatheter PRN Dialysis         Impression/Plan:    1. ESRD: etiology not clear but now with failed kidney transplant (performed in 2014, on HD x 3 years). Receives HD MWF as outpatient and will cont.  HD today s/p PC placement      2. Descending aortic dissection with thrombus -> endovascular repair / PTA + stenting AO / bilat iliacs 1/19 by Dr. Gómez     3. 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 Longstanding HTN: continue home amlodipine 10mg daily, coreg 25mg BID, and hydralazine 100mg TID and lisinopril 20mg daily     5. Anemia- due to CKD / post-op; EPO with HD     6. Seizure disorder     7 Acute hypoxic respiratory failure: 2/2 pulmonary edema and also c/f possible multifocal PNA. Volume removal with HD, abx         Thank you for allowing me to participate in this patient's care. Please feel free to call me with any questions or concerns.     Miguelito Catalan  1/24/2025

## 2025-01-24 NOTE — PLAN OF CARE
Assumed care at 0700  Patient alert, oriented x4  Groin sites soft, no hematoma  Norco given for pain to L groin  IV abx  HD tomorrow. Norma notified    NPO @ [place]. HD cath exchange in IR

## 2025-01-24 NOTE — PROGRESS NOTES
[facility]   part of [facility]     Hospitalist Progress Note     Christian Hrenandez Patient Status:  Inpatient    1985 MRN [id number]   Location [facility] 4SW-A Attending Dakota Gómez MD   Hosp Day # 5 PCP OREN GALAVIZ MD     Chief Complaint: Aortic dissection    Subjective:     Pt feels ok.      Objective:    Review of Systems:   A comprehensive review of systems was completed; pertinent positive and negatives stated in subjective.    Vital signs:  Temp:  [98 °F (36.7 °C)-99.6 °F (37.6 °C)] 99.6 °F (37.6 °C)  Pulse:  [88-97] 94  Resp:  [13-19] 14  BP: (164-184)/() 184/105  SpO2:  [93 %-100 %] 95 %    Physical Exam:    General: No acute distress  Respiratory: No wheezes, no rhonchi  Cardiovascular: S1, S2, regular rate and rhythm  Abdomen: Soft, Non-tender, non-distended, positive bowel sounds  Neuro: No new focal deficits.   Extremities: No edema      Diagnostic Data:    Labs:  Recent Labs   Lab 25  0416 25  0444 25  0534 25  0508 25  0624 25  1629 25  0603 25  0535   WBC 11.1*   < > 17.2* 16.2* 14.3*  --  12.3* 10.7   HGB 8.6*   < > 7.8* 7.7* 7.7* 9.2* 9.4* 9.3*   MCV 93.3   < > 92.5 92.6 91.8  --  88.3 88.1   .0   < > 154.0 139.0* 160.0  --  204.0 256.0   INR 1.28*  --   --   --   --   --   --   --     < > = values in this interval not displayed.       Recent Labs   Lab 25  0416 25  0444 25  0508 25  0624 25  0603 25  0535   * 107* 74 75 91 82   BUN 51* 30* 29* 45* 36* 45*   CREATSERUM 10.96* 7.94* 7.26* 9.99* 8.44* 10.63*   CA 8.2* 8.5* 9.0 8.7 8.9 8.9   ALB 3.9 3.3 3.6  --   --   --     134* 134* 134* 136 137   K 5.8* 4.9 4.4 4.7 4.1 3.9    101 98 97* 99 98   CO2 26.0 27.0 27.0 24.0 25.0 25.0   ALKPHO 68 58 69  --   --   --    AST 11 11 18  --   --   --    ALT <7* <7* <7*  --   --   --    BILT 0.2* <0.2* 0.2*  --   --   --    TP 6.8 6.4 6.5  --   --   --         Estimated Glomerular Filtration Rate: 5.8 mL/min/1.73m2 (A) (by CKD-EPI based on SCr of 10.63 mg/dL (H)).    Recent Labs   Lab 01/18/25  1709   TROPHS 50          Recent Labs   Lab 01/19/25  0416   PTP 16.1*   INR 1.28*                  Microbiology    Hospital Encounter on 01/19/25   1. Blood Culture     Status: None (Preliminary result)    Collection Time: 01/20/25  5:04 AM    Specimen: Blood,peripheral   Result Value Ref Range    Blood Culture Result No Growth 4 Days N/A         Imaging: Reviewed in Epic.    Medications:    valsartan  320 mg Oral Daily    labetalol  5 mg Intravenous Once    epoetin carl  10,000 Units Intravenous Once in dialysis    sodium chloride   Intravenous Once    hydrALAZINE  100 mg Oral TID    carvedilol  25 mg Oral BID with meals    piperacillin-tazobactam  3.375 g Intravenous Q12H    lamoTRIgine  150 mg Oral BID    amLODIPine  10 mg Oral Daily       Assessment & Plan:      #Descending Type B aortic dissection with severe compression/malperfusion of BLLE  -s/p thoracic endovascular aortic repair, angioplasty/stenting of aorta and R/L common iliac aa., R/L external iliac aa.  on 1/19   Cont. Pain control  BP control    #Acute hypoxic respiratory failure  resolved  Cont. To wean O2 as able    #Sepsis  Resolved    #Presumptive gram neg PNA  Zosyn-> ceftin to complete 5-7 day course  CX neg     #Acute anemia 2/2 blood loss on chronic anemia due to ESRD  -expected and related to surgery, no signs or sx of significant active bleeding   -monitor and transfuse PRBC to maintain Hb >7    #ESRD with history of failed renal transplant  -HD   Perm cath exchange 1/24     #Hx of CVA  #Cerebrovascular disease    #Seizure disorder  -lamotrigine    #Right sided paratracheal, inguinal and axillary LAD  -probably reactive/resolving      Dispo:  as above.  DC ACE.  Add diovan.  Discussed with renal    Claudy Castillo MD          Supplementary Documentation:     Quality:  DVT Mechanical Prophylaxis:    SCDs, Early ambuation  DVT Pharmacologic Prophylaxis   Medication    heparin (Porcine) 1000 UNIT/ML injection 1,500 Units                Code Status: Full Code  Rangel: No urinary catheter in place      The 21st Century Cures Act makes medical notes like these available to patients in the interest of transparency. Please be advised this is a medical document. Medical documents are intended to carry relevant information, facts as evident, and the clinical opinion of the practitioner. The medical note is intended as peer to peer communication and may appear blunt or direct. It is written in medical language and may contain abbreviations or verbiage that are unfamiliar.

## 2025-01-24 NOTE — PROGRESS NOTES
[facility]   part of [facility]     Vascular Surgery Progress Note    Christian Hernandez Patient Status:  Inpatient    1985 MRN [id number]   Location [facility] 7NE-A Attending Dakota Gómez MD   Hosp Day # 5 PCP OREN GALAVIZ MD     Objective:   Temp: 99.2 °F (37.3 °C)  Pulse: 97  Resp: 17  BP: 183/89      Events Yesterday/Overnight:  No events overnight. Patient doing well. /89 this morning. WBC normal today at 10.7.      Exam:  Palpable DP and PT pulses. Groin soft, no hematoma. Left groin site tender.    Impression/Plan:    Plan for HD catheter exchange today.Patient stated he is tired and does not want any further dialysis treatments. We discussed the importance of dialysis and the consequences of discontinuing treatment. Patient was agreeable to a catheter exchange today.    Possible discharge     Follow up in vascular clinic with APN-2 weeks      Medications:  Current Facility-Administered Medications   Medication Dose Route Frequency    labetalol (Trandate) 5 mg/mL injection 5 mg  5 mg Intravenous Once    sodium chloride 0.9 % IV bolus 100 mL  100 mL Intravenous Q30 Min PRN    And    albumin human (Albumin) 25% injection 25 g  25 g Intravenous PRN Dialysis    epoetin carl (Epogen, Procrit) 21077 UNIT/ML injection 10,000 Units  10,000 Units Intravenous Once in dialysis    HYDROmorphone (Dilaudid) 1 MG/ML injection 0.6 mg  0.6 mg Intravenous Q3H PRN    HYDROcodone-acetaminophen (Norco) 5-325 MG per tab 1 tablet  1 tablet Oral Q4H PRN    HYDROcodone-acetaminophen (Norco) 5-325 MG per tab 2 tablet  2 tablet Oral Q4H PRN    sodium chloride 0.9% infusion   Intravenous Once    sodium chloride (Saline Mist) 0.65 % nasal solution 1 spray  1 spray Each Nare Q3H PRN    hydrALAZINE (Apresoline) tab 100 mg  100 mg Oral TID    acetaminophen (Tylenol Extra Strength) tab 1,000 mg  1,000 mg Oral Q6H PRN    carvedilol (Coreg) tab 25 mg  25 mg Oral BID with meals    acetaminophen  (Ofirmev) 10 mg/mL infusion premix 1,000 mg  1,000 mg Intravenous Q6H PRN    piperacillin-tazobactam (Zosyn) 3.375 g in dextrose 5% 100 mL IVPB-ADDV  3.375 g Intravenous Q12H    prochlorperazine (Compazine) 10 MG/2ML injection 5 mg  5 mg Intravenous Q8H PRN    lamoTRIgine (LaMICtal) tab 150 mg  150 mg Oral BID    hydrALAzine (Apresoline) 20 mg/mL injection 10 mg  10 mg Intravenous Q4H PRN    melatonin tab 3 mg  3 mg Oral Nightly PRN    polyethylene glycol (PEG 3350) (Miralax) 17 g oral packet 17 g  17 g Oral Daily PRN    sennosides (Senokot) tab 17.2 mg  17.2 mg Oral Nightly PRN    bisacodyl (Dulcolax) 10 MG rectal suppository 10 mg  10 mg Rectal Daily PRN    ondansetron (Zofran) 4 MG/2ML injection 4 mg  4 mg Intravenous Q6H PRN    amLODIPine (Norvasc) tab 10 mg  10 mg Oral Daily    heparin (Porcine) 1000 UNIT/ML injection 1,500 Units  1.5 mL Intracatheter PRN Dialysis       Laboratory/Data:    LABS:  Recent Labs   Lab 01/19/25 0416 01/20/25 0444 01/20/25  0534 01/21/25  0508 01/22/25  0624 01/22/25  1629 01/23/25  0603 01/24/25  0535   WBC 11.1*   < > 17.2* 16.2* 14.3*  --  12.3* 10.7   HGB 8.6*   < > 7.8* 7.7* 7.7* 9.2* 9.4* 9.3*   MCV 93.3   < > 92.5 92.6 91.8  --  88.3 88.1   .0   < > 154.0 139.0* 160.0  --  204.0 256.0   INR 1.28*  --   --   --   --   --   --   --     < > = values in this interval not displayed.       Recent Labs   Lab 01/19/25  0416 01/20/25 0444 01/21/25  0508 01/22/25  0624 01/23/25  0603 01/24/25  0535    134* 134* 134* 136 137   K 5.8* 4.9 4.4 4.7 4.1 3.9    101 98 97* 99 98   CO2 26.0 27.0 27.0 24.0 25.0 25.0   BUN 51* 30* 29* 45* 36* 45*   CREATSERUM 10.96* 7.94* 7.26* 9.99* 8.44* 10.63*   * 107* 74 75 91 82   CA 8.2* 8.5* 9.0 8.7 8.9 8.9   MG 2.2 2.0  --   --   --   --    PHOS 7.0* 6.1*  --   --   --   --      Recent [facility]   Lab 01/18/25 1709 01/19/25  0416   PTP  --  16.1*   INR  --  1.28*   PTT 30.0  --      Recent [facility]   Lab 01/18/25 1709 01/19/25  0416  01/20/25  0444 01/21/25  0508   ALT 7* <7* <7* <7*   AST 10 11 11 18   ALB 3.9 3.9 3.3 3.6     No results for input(s): \"TROP\" in the last 168 hours.  No results found for: \"ANAS\", \"STORM\", \"ANASCRN\"  Recent Labs   Lab 01/19/25  0546   HBSAG Nonreactive       KIP Polanco  1/24/2025  7:24 AM

## 2025-01-24 NOTE — PLAN OF CARE
Assumed care at 1930,  Patient is alert and oriented X 4  SR on tele, on RA   BP is elevated throughout the night  PRN hydralazine given, not effect.  L groin site tender, norco given X 1  Call light within reach, all needs met.

## 2025-01-24 NOTE — PROCEDURES
[facility]   part of [facility]  Procedure Note    Christian Hernandez Patient Status:  Inpatient    1985 MRN [id number]   Location [facility] 7NE-A Attending Dakota Gómez MD   Hosp Day # 5 PCP OREN GALAVIZ MD     Procedure: Perm catheter exchange    Pre-Procedure Diagnosis:  Malfunction    Post-Procedure Diagnosis: Same    Anesthesia:  Local and Sedation    Findings:  Successful over the wire exchange of perm catheter, right chest. 23cm tip-to-cuff.    Specimens: None    Blood Loss:  <5ml    Tourniquet Time: 0  Complications:  None  Drains:  None    Secondary Diagnosis:  None    Vasiliy Knapp MD  2025

## 2025-01-24 NOTE — PROGRESS NOTES
[facility]   part of [facility]     Hospitalist Progress Note     Christian Hernandez Patient Status:  Inpatient    1985 MRN [id number]   Location [facility] 4SW-A Attending Dakota Gómez MD   Hosp Day # 4 PCP OREN GALAVIZ MD     Chief Complaint: Aortic dissection    Subjective:     Pt has some pain.  No other complaints.     Objective:    Review of Systems:   A comprehensive review of systems was completed; pertinent positive and negatives stated in subjective.    Vital signs:  Temp:  [97.9 °F (36.6 °C)-99.8 °F (37.7 °C)] 98.9 °F (37.2 °C)  Pulse:  [90-98] 93  Resp:  [10-17] 15  BP: (162-186)/() 181/97  SpO2:  [91 %-99 %] 93 %    Physical Exam:    General: No acute distress  Respiratory: No wheezes, no rhonchi  Cardiovascular: S1, S2, regular rate and rhythm  Abdomen: Soft, Non-tender, non-distended, positive bowel sounds  Neuro: No new focal deficits.   Extremities: No edema      Diagnostic Data:    Labs:  Recent Labs   Lab 254 25  0534 25  0508 25  0624 25  1629 25  0603   WBC 11.1* 19.6* 17.2* 16.2* 14.3*  --  12.3*   HGB 8.6* 5.5* 7.8* 7.7* 7.7* 9.2* 9.4*   MCV 93.3 93.1 92.5 92.6 91.8  --  88.3   .0 167.0 154.0 139.0* 160.0  --  204.0   INR 1.28*  --   --   --   --   --   --        Recent Labs   Lab 25  0444 25  0508 25  0624 25  0603   * 107* 74 75 91   BUN 51* 30* 29* 45* 36*   CREATSERUM 10.96* 7.94* 7.26* 9.99* 8.44*   CA 8.2* 8.5* 9.0 8.7 8.9   ALB 3.9 3.3 3.6  --   --     134* 134* 134* 136   K 5.8* 4.9 4.4 4.7 4.1    101 98 97* 99   CO2 26.0 27.0 27.0 24.0 25.0   ALKPHO 68 58 69  --   --    AST 11 11 18  --   --    ALT <7* <7* <7*  --   --    BILT 0.2* <0.2* 0.2*  --   --    TP 6.8 6.4 6.5  --   --        Estimated Glomerular Filtration Rate: 7.7 mL/min/1.73m2 (A) (by CKD-EPI based on SCr of 8.44 mg/dL (H)).    Recent Labs   Lab 25  2922    TROPHS 50          Recent Labs   Lab 01/19/25  0416   PTP 16.1*   INR 1.28*                  Microbiology    Hospital Encounter on 01/19/25   1. Blood Culture     Status: None (Preliminary result)    Collection Time: 01/20/25  5:04 AM    Specimen: Blood,peripheral   Result Value Ref Range    Blood Culture Result No Growth 3 Days N/A         Imaging: Reviewed in Epic.    Medications:    labetalol  5 mg Intravenous Once    [START ON 1/24/2025] epoetin carl  10,000 Units Intravenous Once in dialysis    sodium chloride   Intravenous Once    lisinopril  20 mg Oral Daily    hydrALAZINE  100 mg Oral TID    carvedilol  25 mg Oral BID with meals    piperacillin-tazobactam  3.375 g Intravenous Q12H    lamoTRIgine  150 mg Oral BID    amLODIPine  10 mg Oral Daily       Assessment & Plan:      #Descending Type B aortic dissection with severe compression/malperfusion of BLLE  -s/p thoracic endovascular aortic repair, angioplasty/stenting of aorta and R/L common iliac aa., R/L external iliac aa.  on 1/19   Cont. Pain control  BP control    #Acute hypoxic respiratory failure  Resolving  Cont. To wean O2 as able    #Sepsis  Resolved    #Presumptive gram neg PNA  Cont. Zosyn  CX neg  Likely can DC ABX after 5 days if remains stable     #Acute anemia 2/2 blood loss on chronic anemia due to ESRD  -expected and related to surgery, no signs or sx of significant active bleeding   -monitor and transfuse PRBC to maintain Hb >7    #ESRD with history of failed renal transplant  -HD per nephro     #Hx of CVA  #Cerebrovascular disease    #Seizure disorder  -lamotrigine    #Right sided paratracheal, inguinal and axillary LAD  -probably reactive/resolving      Dispo:  BP elevated.  Hold ACE.  Will discuss with renal in AM,  RE: [id number]    Claudy Castillo MD          Supplementary Documentation:     Quality:  DVT Mechanical Prophylaxis:   SCDs, Early ambuation  DVT Pharmacologic Prophylaxis   Medication    heparin (Porcine) 1000 UNIT/ML  injection 1,500 Units                Code Status: Full Code  Rangel: No urinary catheter in place      The 21st Century Cures Act makes medical notes like these available to patients in the interest of transparency. Please be advised this is a medical document. Medical documents are intended to carry relevant information, facts as evident, and the clinical opinion of the practitioner. The medical note is intended as peer to peer communication and may appear blunt or direct. It is written in medical language and may contain abbreviations or verbiage that are unfamiliar.

## 2025-01-25 VITALS
TEMPERATURE: 98 F | OXYGEN SATURATION: 97 % | RESPIRATION RATE: 20 BRPM | DIASTOLIC BLOOD PRESSURE: 96 MMHG | WEIGHT: 183.63 LBS | HEART RATE: 111 BPM | BODY MASS INDEX: 25 KG/M2 | SYSTOLIC BLOOD PRESSURE: 184 MMHG

## 2025-01-25 PROCEDURE — 99232 SBSQ HOSP IP/OBS MODERATE 35: CPT | Performed by: HOSPITALIST

## 2025-01-25 PROCEDURE — 99232 SBSQ HOSP IP/OBS MODERATE 35: CPT | Performed by: INTERNAL MEDICINE

## 2025-01-25 RX ORDER — NIFEDIPINE 90 MG/1
90 TABLET, EXTENDED RELEASE ORAL DAILY
Qty: 90 TABLET | Refills: 1 | Status: SHIPPED | OUTPATIENT
Start: 2025-01-25

## 2025-01-25 RX ORDER — CLONIDINE HYDROCHLORIDE 0.2 MG/1
0.2 TABLET ORAL 2 TIMES DAILY
Qty: 180 TABLET | Refills: 1 | Status: SHIPPED | OUTPATIENT
Start: 2025-01-25

## 2025-01-25 RX ORDER — CLONIDINE HYDROCHLORIDE 0.1 MG/1
0.1 TABLET ORAL 2 TIMES DAILY
Status: DISCONTINUED | OUTPATIENT
Start: 2025-01-25 | End: 2025-01-25

## 2025-01-25 RX ORDER — CLONIDINE HYDROCHLORIDE 0.1 MG/1
0.2 TABLET ORAL 2 TIMES DAILY
Status: DISCONTINUED | OUTPATIENT
Start: 2025-01-25 | End: 2025-01-25

## 2025-01-25 RX ORDER — HYDROCODONE BITARTRATE AND ACETAMINOPHEN 5; 325 MG/1; MG/1
1-2 TABLET ORAL EVERY 6 HOURS PRN
Qty: 20 TABLET | Refills: 0 | Status: SHIPPED | OUTPATIENT
Start: 2025-01-25

## 2025-01-25 RX ORDER — CEFPODOXIME PROXETIL 200 MG/1
200 TABLET, FILM COATED ORAL DAILY
Qty: 3 TABLET | Refills: 0 | Status: SHIPPED | OUTPATIENT
Start: 2025-01-25 | End: 2025-01-28

## 2025-01-25 RX ORDER — GABAPENTIN 100 MG/1
100 CAPSULE ORAL 3 TIMES DAILY
Status: DISCONTINUED | OUTPATIENT
Start: 2025-01-25 | End: 2025-01-25

## 2025-01-25 RX ORDER — VALSARTAN 320 MG/1
320 TABLET ORAL DAILY
Qty: 90 TABLET | Refills: 1 | Status: SHIPPED | OUTPATIENT
Start: 2025-01-26

## 2025-01-25 RX ORDER — CLONIDINE HYDROCHLORIDE 0.1 MG/1
0.1 TABLET ORAL ONCE
Status: COMPLETED | OUTPATIENT
Start: 2025-01-25 | End: 2025-01-25

## 2025-01-25 NOTE — PROGRESS NOTES
[facility]   part of [facility]     Hospitalist Progress Note     Christian Hernandez Patient Status:  Inpatient    1985 MRN [id number]   Location [facility] 4SW-A Attending Dakota Gómez MD   Hosp Day # 6 PCP OREN GALAVIZ MD     Chief Complaint: Aortic dissection    Subjective:     Pt has some pain but not severe.  Wants to go home.      Objective:    Review of Systems:   A comprehensive review of systems was completed; pertinent positive and negatives stated in subjective.    Vital signs:  Temp:  [98.2 °F (36.8 °C)-98.6 °F (37 °C)] 98.3 °F (36.8 °C)  Pulse:  [83-95] 90  Resp:  [9-24] 18  BP: (160-189)/() 184/82  SpO2:  [97 %-99 %] 97 %    Physical Exam:    General: No acute distress  Respiratory: No wheezes, no rhonchi  Cardiovascular: S1, S2, regular rate and rhythm  Abdomen: Soft, Non-tender, non-distended, positive bowel sounds  Neuro: No new focal deficits.   Extremities: No edema      Diagnostic Data:    Labs:  Recent Labs   Lab 25  0416 25  0444 25  0534 25  0508 25  0624 25  1629 25  0603 25  0535   WBC 11.1*   < > 17.2* 16.2* 14.3*  --  12.3* 10.7   HGB 8.6*   < > 7.8* 7.7* 7.7* 9.2* 9.4* 9.3*   MCV 93.3   < > 92.5 92.6 91.8  --  88.3 88.1   .0   < > 154.0 139.0* 160.0  --  204.0 256.0   INR 1.28*  --   --   --   --   --   --   --     < > = values in this interval not displayed.       Recent Labs   Lab 25  0416 25  0444 25  0508 25  0624 25  0603 25  0535   * 107* 74 75 91 82   BUN 51* 30* 29* 45* 36* 45*   CREATSERUM 10.96* 7.94* 7.26* 9.99* 8.44* 10.63*   CA 8.2* 8.5* 9.0 8.7 8.9 8.9   ALB 3.9 3.3 3.6  --   --   --     134* 134* 134* 136 137   K 5.8* 4.9 4.4 4.7 4.1 3.9    101 98 97* 99 98   CO2 26.0 27.0 27.0 24.0 25.0 25.0   ALKPHO 68 58 69  --   --   --    AST 11 11 18  --   --   --    ALT <7* <7* <7*  --   --   --    BILT 0.2* <0.2* 0.2*  --   --   --     TP 6.8 6.4 6.5  --   --   --        Estimated Glomerular Filtration Rate: 5.8 mL/min/1.73m2 (A) (by CKD-EPI based on SCr of 10.63 mg/dL (H)).    Recent Labs   Lab 01/18/25  1709   TROPHS 50          Recent Labs   Lab 01/19/25  0416   PTP 16.1*   INR 1.28*                  Microbiology    Hospital Encounter on 01/19/25   1. Blood Culture     Status: None    Collection Time: 01/20/25  5:04 AM    Specimen: Blood,peripheral   Result Value Ref Range    Blood Culture Result No Growth 5 Days N/A         Imaging: Reviewed in Epic.    Medications:    gabapentin  100 mg Oral TID    cloNIDine  0.2 mg Oral BID    valsartan  320 mg Oral Daily    cefuroxime  250 mg Oral Daily    labetalol  5 mg Intravenous Once    sodium chloride   Intravenous Once    hydrALAZINE  100 mg Oral TID    carvedilol  25 mg Oral BID with meals    lamoTRIgine  150 mg Oral BID    amLODIPine  10 mg Oral Daily       Assessment & Plan:      #Descending Type B aortic dissection with severe compression/malperfusion of BLLE  -s/p thoracic endovascular aortic repair, angioplasty/stenting of aorta and R/L common iliac aa., R/L external iliac aa.  on 1/19   Cont. Pain control  BP control    #Acute hypoxic respiratory failure  resolved  Cont. To wean O2 as able    #Sepsis  Resolved    #Presumptive gram neg PNA  Zosyn-> ceftin to complete 5-7 day course  CX neg     #Acute anemia 2/2 blood loss on chronic anemia due to ESRD  -expected and related to surgery, no signs or sx of significant active bleeding   -monitor and transfuse PRBC to maintain Hb >7    #ESRD with history of failed renal transplant  -HD   Perm cath exchange 1/24     #Hx of CVA  #Cerebrovascular disease    #Seizure disorder  -lamotrigine    #Right sided paratracheal, inguinal and axillary LAD  -probably reactive/resolving      Dispo:  as above. Add clonidine.  DC on nifedipine.  Discussed with renal  DC on vantin.  Leukocytosis resolved.  Pain stable now off IV pain meds.  Reviewed meds with  patient in detail.  Reviewed all diagnosis and treatment plans with him in detail. Instructed him on importance of compliance and follow up.  Pt to monitor his BP closely at home and follow up with his PCP and nephrologist closely.  Pt states he would like to DC home today.     Claudy Castillo MD          Supplementary Documentation:     Quality:  DVT Mechanical Prophylaxis:   SCDs, Early ambuation  DVT Pharmacologic Prophylaxis   Medication    heparin (Porcine) 1000 UNIT/ML injection 1,500 Units                Code Status: Full Code  Rangel: No urinary catheter in place      The 21st Century Cures Act makes medical notes like these available to patients in the interest of transparency. Please be advised this is a medical document. Medical documents are intended to carry relevant information, facts as evident, and the clinical opinion of the practitioner. The medical note is intended as peer to peer communication and may appear blunt or direct. It is written in medical language and may contain abbreviations or verbiage that are unfamiliar.

## 2025-01-25 NOTE — PROGRESS NOTES
0700-Bedside report received from Natalie Berkowitz RN. Assumed care of patient.  0900-Assessment completed. Dark purple bruise noted to epidural insertion site. Approximately the size of a half dollar. Area soft to touch. No drainage noted. Patient progressing according to plan of care.  Patient encouraged to call for any needs.  Discussed pain management. Patient states she will ask for pain medication as needed. Bonding with infant, FOB at bedside.  1125-Patient education and discharge instructions reviewed with patient by Vicki Reed RN with stated understanding by patient.  Patient states all questions have been answered and denies any problems.  Patient discharged to home in stable condition with all belongings.   [facility]   part of [facility]     Nephrology Progress Note    Christian Hernandez Patient Status:  Inpatient    1985 MRN [id number]   Location [facility] 4SW-A Attending Dakota Gómez MD   Hosp Day # 6 PCP OREN GALAVIZ MD       SUBJECTIVE:    Reports continued pain in groin. HD yesterday with 3L removed. BP remains elevated.     Current Facility-Administered Medications   Medication Dose Route Frequency    gabapentin (Neurontin) cap 100 mg  100 mg Oral TID    cloNIDine (Catapres) tab 0.1 mg  0.1 mg Oral BID    valsartan (Diovan) tab 320 mg  320 mg Oral Daily    cefuroxime (Ceftin) tab 250 mg  250 mg Oral Daily    labetalol (Trandate) 5 mg/mL injection 5 mg  5 mg Intravenous Once    sodium chloride 0.9 % IV bolus 100 mL  100 mL Intravenous Q30 Min PRN    And    albumin human (Albumin) 25% injection 25 g  25 g Intravenous PRN Dialysis    HYDROcodone-acetaminophen (Norco) 5-325 MG per tab 1 tablet  1 tablet Oral Q4H PRN    HYDROcodone-acetaminophen (Norco) 5-325 MG per tab 2 tablet  2 tablet Oral Q4H PRN    sodium chloride 0.9% infusion   Intravenous Once    sodium chloride (Saline Mist) 0.65 % nasal solution 1 spray  1 spray Each Nare Q3H PRN    hydrALAZINE (Apresoline) tab 100 mg  100 mg Oral TID    acetaminophen (Tylenol Extra Strength) tab 1,000 mg  1,000 mg Oral Q6H PRN    carvedilol (Coreg) tab 25 mg  25 mg Oral BID with meals    acetaminophen (Ofirmev) 10 mg/mL infusion premix 1,000 mg  1,000 mg Intravenous Q6H PRN    prochlorperazine (Compazine) 10 MG/2ML injection 5 mg  5 mg Intravenous Q8H PRN    lamoTRIgine (LaMICtal) tab 150 mg  150 mg Oral BID    hydrALAzine (Apresoline) 20 mg/mL injection 10 mg  10 mg Intravenous Q4H PRN    melatonin tab 3 mg  3 mg Oral Nightly PRN    polyethylene glycol (PEG 3350) (Miralax) 17 g oral packet 17 g  17 g Oral Daily PRN    sennosides (Senokot) tab 17.2 mg  17.2 mg Oral Nightly PRN    bisacodyl (Dulcolax) 10 MG rectal suppository 10 mg  10  mg Rectal Daily PRN    ondansetron (Zofran) 4 MG/2ML injection 4 mg  4 mg Intravenous Q6H PRN    amLODIPine (Norvasc) tab 10 mg  10 mg Oral Daily    heparin (Porcine) 1000 UNIT/ML injection 1,500 Units  1.5 mL Intracatheter PRN Dialysis         Physical Exam:   BP (!) 168/92 (BP Location: Right arm)   Pulse 83   Temp 98.2 °F (36.8 °C) (Oral)   Resp (!) 9   Wt 183 lb 10.3 oz (83.3 kg)   SpO2 99%   BMI 24.91 kg/m²   Temp (24hrs), Av.5 °F (36.9 °C), Min:98.2 °F (36.8 °C), Max:99 °F (37.2 °C)       Intake/Output Summary (Last 24 hours) at 2025 1144  Last data filed at 2025 0930  Gross per 24 hour   Intake 180 ml   Output 3000 ml   Net -2820 ml       Last 3 Weights   25 0330 183 lb 10.3 oz (83.3 kg)   25 0000 184 lb 11.9 oz (83.8 kg)   25 0015 185 lb 6.5 oz (84.1 kg)   25 0300 179 lb 14.3 oz (81.6 kg)   25 2002 201 lb 15.1 oz (91.6 kg)   25 1658 177 lb (80.3 kg)   19 0317 214 lb (97.1 kg)     General: Alert and oriented in no apparent distress.  HEENT: No scleral icterus, MMM  Neck: Supple, no CJ or thyromegaly  Cardiac: Regular rate and rhythm, S1, S2 normal, no murmur   Lungs: Clear without wheezes, rales, rhonchi.    Abdomen: Soft, non-tender.   Extremities: Without clubbing, cyanosis or edema.  Neurologic:  moving all extremities  Skin: Warm and dry, no rash    Labs:     Recent Labs   Lab 25  0416 25  0444 25  0534 25  0508 25  0624 25  1629 25  0603 25  0535   WBC 11.1*   < > 17.2* 16.2* 14.3*  --  12.3* 10.7   HGB 8.6*   < > 7.8* 7.7* 7.7* 9.2* 9.4* 9.3*   MCV 93.3   < > 92.5 92.6 91.8  --  88.3 88.1   .0   < > 154.0 139.0* 160.0  --  204.0 256.0   INR 1.28*  --   --   --   --   --   --   --     < > = values in this interval not displayed.       Recent Labs   Lab 25  0416 25  0444 25  0508 25  0624 25  0603 25  0535    134* 134* 134* 136 137   K 5.8* 4.9 4.4  4.7 4.1 3.9    101 98 97* 99 98   CO2 26.0 27.0 27.0 24.0 25.0 25.0   BUN 51* 30* 29* 45* 36* 45*   CREATSERUM 10.96* 7.94* 7.26* 9.99* 8.44* 10.63*   CA 8.2* 8.5* 9.0 8.7 8.9 8.9   MG 2.2 2.0  --   --   --   --    PHOS 7.0* 6.1*  --   --   --   --    * 107* 74 75 91 82       Recent Labs   Lab 01/18/25  1709 01/19/25  0416 01/20/25  0444 01/21/25  0508   ALT 7* <7* <7* <7*   AST 10 11 11 18   ALB 3.9 3.9 3.3 3.6       Recent Labs   Lab 01/18/25  1708 01/19/25  0035   PGLU 102* 104*       Meds:   Current Facility-Administered Medications   Medication Dose Route Frequency    gabapentin (Neurontin) cap 100 mg  100 mg Oral TID    cloNIDine (Catapres) tab 0.1 mg  0.1 mg Oral BID    valsartan (Diovan) tab 320 mg  320 mg Oral Daily    cefuroxime (Ceftin) tab 250 mg  250 mg Oral Daily    labetalol (Trandate) 5 mg/mL injection 5 mg  5 mg Intravenous Once    sodium chloride 0.9 % IV bolus 100 mL  100 mL Intravenous Q30 Min PRN    And    albumin human (Albumin) 25% injection 25 g  25 g Intravenous PRN Dialysis    HYDROcodone-acetaminophen (Norco) 5-325 MG per tab 1 tablet  1 tablet Oral Q4H PRN    HYDROcodone-acetaminophen (Norco) 5-325 MG per tab 2 tablet  2 tablet Oral Q4H PRN    sodium chloride 0.9% infusion   Intravenous Once    sodium chloride (Saline Mist) 0.65 % nasal solution 1 spray  1 spray Each Nare Q3H PRN    hydrALAZINE (Apresoline) tab 100 mg  100 mg Oral TID    acetaminophen (Tylenol Extra Strength) tab 1,000 mg  1,000 mg Oral Q6H PRN    carvedilol (Coreg) tab 25 mg  25 mg Oral BID with meals    acetaminophen (Ofirmev) 10 mg/mL infusion premix 1,000 mg  1,000 mg Intravenous Q6H PRN    prochlorperazine (Compazine) 10 MG/2ML injection 5 mg  5 mg Intravenous Q8H PRN    lamoTRIgine (LaMICtal) tab 150 mg  150 mg Oral BID    hydrALAzine (Apresoline) 20 mg/mL injection 10 mg  10 mg Intravenous Q4H PRN    melatonin tab 3 mg  3 mg Oral Nightly PRN    polyethylene glycol (PEG 3350) (Miralax) 17 g oral packet  17 g  17 g Oral Daily PRN    sennosides (Senokot) tab 17.2 mg  17.2 mg Oral Nightly PRN    bisacodyl (Dulcolax) 10 MG rectal suppository 10 mg  10 mg Rectal Daily PRN    ondansetron (Zofran) 4 MG/2ML injection 4 mg  4 mg Intravenous Q6H PRN    amLODIPine (Norvasc) tab 10 mg  10 mg Oral Daily    heparin (Porcine) 1000 UNIT/ML injection 1,500 Units  1.5 mL Intracatheter PRN Dialysis         Impression/Plan:    1. ESRD: etiology not clear but now with failed kidney transplant (performed in 2014, on HD x 3 years). He reports that he has been thinking about stopping dialysis, discussed that missing dialysis has significant risks and is associated with mortality. Encouraged him to be revaluated for new kidney transplant. He expressed understanding. Receives HD MWF as outpatient and will cont per outpatient schedule.      2. Descending aortic dissection with thrombus -> endovascular repair / PTA + stenting AO / bilat iliacs 1/19 by Dr. Gómez     3. 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 Longstanding HTN: continue home amlodipine 10mg daily, coreg 25mg BID, and hydralazine 100mg TID. Agree with switching Lisinopril to Valsartan for additional BP control. Given persistently elevated blood pressures, agree with adding clonidine      5. Anemia- due to CKD / post-op; EPO with HD     6. Seizure disorder     Thank you for allowing me to participate in this patient's care. Please feel free to call me with any questions or concerns.     Ashanti Knapp MD  01/25/25

## 2025-01-25 NOTE — DISCHARGE INSTRUCTIONS
You should resume all home medications as previously taking unless otherwise instructed.    Take Norco or tylenol as needed for pain.    No heavy lifting or straining for 2 weeks. You may then resume to normal activity.    Drink plenty of fluids to stay well hydrated.     You will need to followup with Dr. Gómez in the vascular clinic in 2 weeks    If you have any fevers, chills, chest pain, shortness of breath, drainage, swelling or bleeding, please call the office in order to return to clinic sooner for evaluation.

## 2025-01-25 NOTE — PROGRESS NOTES
Vascular Surgery Progress Note    No acute events overnight. Patient continues to report pain in the groin. He is still getting dilaudid. Bilateral groins are soft with no hematoma. Feet are warm, well perfused. Blood pressure remains elevated.   38 y/o male s/p TEVAR for aortic dissection. Discussed with patient and RN.  Pain will need to be controlled on oral pain medications only if he is to be discharged today. Will discontinue dilaudid completely; add gabapentin. BP remains an issue as well. Possible discharge home today vs tomorrow pending resolution of these issues.     Dary Small MD  01/25/25  8:58 AM

## 2025-01-25 NOTE — PLAN OF CARE
Assumed care at 1930,  Patient is alert and oriented X 4  Patient's BP was elevated, BP meds given  C/o pain in the groin area.  Dilaudid given x 1, and norco X 2  Call light within reach, questions answered, all needs met.

## 2025-01-25 NOTE — PLAN OF CARE
Patient AAO X 4  SR on tele, on RA   New permacath in right chest  HD completed today. 3L of fluids removed  L groin site tender and right groin soft. No hematoma  C/O moderate pain from lower abdomen. norco & dilaudid given  Comfort and safety rounds done. Call light in reach  Patient and sister updated on care plan and discharge plan

## 2025-01-25 NOTE — PLAN OF CARE
NURSING DISCHARGE NOTE    Discharged  via Wheelchair.  Accompanied by Family member and Support staff  Belongings Taken by patient/family.    Assumed care @6143  hypertensive,   A&Ox4, RA  NSR ,   8/10 Pain in L groin, prn dilaudid given with relief.  Gabapentin started.  Up with standby assist as tolerated.    Tolerating Regular diet.  Anuric. No BM  L and R groins SHANTELLE, C/D/I    Discharge navigator complete.  Discussed medications and upcoming appointments.  All questions answered.     Problem: CARDIOVASCULAR - ADULT  Goal: Maintains optimal cardiac output and hemodynamic stability  Description: INTERVENTIONS:  - Monitor vital signs, rhythm, and trends  - Monitor for bleeding, hypotension and signs of decreased cardiac output  - Evaluate effectiveness of vasoactive medications to optimize hemodynamic stability  - Monitor arterial and/or venous puncture sites for bleeding and/or hematoma  - Assess quality of pulses, skin color and temperature  - Assess for signs of decreased coronary artery perfusion - ex. Angina  - Evaluate fluid balance, assess for edema, trend weights  Outcome: Adequate for Discharge     Problem: RESPIRATORY - ADULT  Goal: Achieves optimal ventilation and oxygenation  Description: INTERVENTIONS:  - Assess for changes in respiratory status  - Assess for changes in mentation and behavior  - Position to facilitate oxygenation and minimize respiratory effort  - Oxygen supplementation based on oxygen saturation or ABGs  - Provide Smoking Cessation handout, if applicable  - Encourage broncho-pulmonary hygiene including cough, deep breathe, Incentive Spirometry  - Assess the need for suctioning and perform as needed  - Assess and instruct to report SOB or any respiratory difficulty  - Respiratory Therapy support as indicated  - Manage/alleviate anxiety  - Monitor for signs/symptoms of CO2 retention  Outcome: Adequate for Discharge     Problem: SKIN/TISSUE INTEGRITY - ADULT  Goal: Incision(s),  wounds(s) or drain site(s) healing without S/S of infection  Description: INTERVENTIONS:  - Assess and document risk factors for pressure ulcer development  - Assess and document skin integrity  - Assess and document dressing/incision, wound bed, drain sites and surrounding tissue  - Implement wound care per orders  - Initiate isolation precautions as appropriate  - Initiate Pressure Ulcer prevention bundle as indicated  Outcome: Adequate for Discharge

## 2025-01-30 LAB
ISTAT ACTIVATED CLOTTING TIME: 187 SECONDS (ref 125–137)
ISTAT ACTIVATED CLOTTING TIME: 227 SECONDS (ref 125–137)
ISTAT ACTIVATED CLOTTING TIME: 227 SECONDS (ref 125–137)
ISTAT ACTIVATED CLOTTING TIME: 233 SECONDS (ref 125–137)
ISTAT ACTIVATED CLOTTING TIME: 256 SECONDS (ref 125–137)

## 2025-01-31 NOTE — PROGRESS NOTES
Physician Clarification    Additional information related to the primary reason for transfer/admission to [facility] following vascular surgery at [facility]:    Other: Higher level of care regarding risk for spinal cord ischemia.    This note is part of the patient's medical record.

## 2025-01-31 NOTE — PROGRESS NOTES
Physician Clarification    Additional information related to the patient's pulmonary edema         Acute pulmonary edema        This note is part of the patient's medical record.

## 2025-02-02 NOTE — PROGRESS NOTES
Provider Clarification     Additional information on the status of sepsis    Sepsis is confirmed     This note is part of the patient's medical record.

## 2025-02-03 NOTE — DISCHARGE SUMMARY
[facility]  Discharge Summary    Christian Hernandez Patient Status:  Inpatient    1985 MRN [id number]   Location [facility] 7NE-A Attending No att. providers found   Hosp Day # 6 PCP OREN GALAVIZ MD     Date of Admission: 2025    Date of Discharge: 2025  4:33 PM    Admitting Diagnosis: Aortic dissection (HCC) [I71.00]    Discharge Diagnosis:   Patient Active Problem List   Diagnosis    Aortic dissection (HCC)    Dissection of aorta, unspecified portion of aorta (HCC)    Essential hypertension    Dissection of aorta, thoracoabdominal (HCC)    Descending thoracic aortic dissection (HCC)    ESRD (end stage renal disease) (HCC)    Anemia in ESRD (end-stage renal disease) (HCC)    Acute hypoxic respiratory failure (HCC)    Ascending aortic aneurysm       Reason for Admission: Dissection    Hospital Course: 38 yo M with acute type B dissection with lower extremity malperfusion. He underwent emergent TEVAR. Postop course unremarkable except with BP management. On POD6 pt was tolerating PO, ambulating and undergoing HD without issue.     Consultations: Nephrology, Hospitalist.    Procedures: TEVAR    Complications: None    Disposition: Home or Self Care    Discharge Condition: Good    Discharge Medications:   Discharge Medication List as of 2025  3:45 PM        START taking these medications    Details   valsartan 320 MG Oral Tab Take 1 tablet (320 mg total) by mouth daily., Normal, Disp-90 tablet, R-1      cloNIDine 0.2 MG Oral Tab Take 1 tablet (0.2 mg total) by mouth 2 (two) times daily., Normal, Disp-180 tablet, R-1      HYDROcodone-acetaminophen 5-325 MG Oral Tab Take 1-2 tablets by mouth every 6 (six) hours as needed., Normal, Disp-20 tablet, R-0      NIFEdipine ER 90 MG Oral Tablet 24 Hr Take 1 tablet (90 mg total) by mouth daily., Normal, Disp-90 tablet, R-1      cefpodoxime 200 MG Oral Tab Take 1 tablet (200 mg total) by mouth daily for 3 days., Normal, Disp-3 tablet, R-0            CONTINUE these medications which have NOT CHANGED    Details   carvedilol 25 MG Oral Tab Take 1 tablet (25 mg total) by mouth 2 (two) times daily with meals., Historical      hydrALAZINE 100 MG Oral Tab Take 1 tablet (100 mg total) by mouth 3 (three) times daily., Historical      lamoTRIgine 150 MG Oral Tab Take 1 tablet (150 mg total) by mouth Q12H., Historical           STOP taking these medications       amLODIPine 10 MG Oral Tab              Follow up Visits: Follow-up with Dr Gómez in 2 weeks.      Dakota Gómez MD  2/3/2025  5:06 PM

## 2025-05-20 LAB
BASOPHILS # BLD AUTO: 0.03 X10(3) UL (ref 0–0.2)
BASOPHILS NFR BLD AUTO: 0.5 %
EOSINOPHIL # BLD AUTO: 0.22 X10(3) UL (ref 0–0.7)
EOSINOPHIL NFR BLD AUTO: 4 %
ERYTHROCYTE [DISTWIDTH] IN BLOOD BY AUTOMATED COUNT: 17.2 %
HCT VFR BLD AUTO: 33 % (ref 39–53)
HGB BLD-MCNC: 10.7 G/DL (ref 13–17.5)
IMM GRANULOCYTES # BLD AUTO: 0.01 X10(3) UL (ref 0–1)
IMM GRANULOCYTES NFR BLD: 0.2 %
LYMPHOCYTES # BLD AUTO: 0.63 X10(3) UL (ref 1–4)
LYMPHOCYTES NFR BLD AUTO: 11.5 %
MCH RBC QN AUTO: 25.8 PG (ref 26–34)
MCHC RBC AUTO-ENTMCNC: 32.4 G/DL (ref 31–37)
MCV RBC AUTO: 79.7 FL (ref 80–100)
MONOCYTES # BLD AUTO: 0.87 X10(3) UL (ref 0.1–1)
MONOCYTES NFR BLD AUTO: 15.9 %
NEUTROPHILS # BLD AUTO: 3.72 X10 (3) UL (ref 1.5–7.7)
NEUTROPHILS # BLD AUTO: 3.72 X10(3) UL (ref 1.5–7.7)
NEUTROPHILS NFR BLD AUTO: 67.9 %
PLATELET # BLD AUTO: 230 10(3)UL (ref 150–450)
RBC # BLD AUTO: 4.14 X10(6)UL (ref 4.3–5.7)
WBC # BLD AUTO: 5.5 X10(3) UL (ref 4–11)

## 2025-05-20 PROCEDURE — 80053 COMPREHEN METABOLIC PANEL: CPT

## 2025-05-20 PROCEDURE — 80053 COMPREHEN METABOLIC PANEL: CPT | Performed by: STUDENT IN AN ORGANIZED HEALTH CARE EDUCATION/TRAINING PROGRAM

## 2025-05-20 PROCEDURE — 93005 ELECTROCARDIOGRAM TRACING: CPT

## 2025-05-20 PROCEDURE — 85025 COMPLETE CBC W/AUTO DIFF WBC: CPT

## 2025-05-20 PROCEDURE — 85025 COMPLETE CBC W/AUTO DIFF WBC: CPT | Performed by: STUDENT IN AN ORGANIZED HEALTH CARE EDUCATION/TRAINING PROGRAM

## 2025-05-20 PROCEDURE — 93010 ELECTROCARDIOGRAM REPORT: CPT

## 2025-05-20 PROCEDURE — 99284 EMERGENCY DEPT VISIT MOD MDM: CPT

## 2025-05-20 PROCEDURE — 99285 EMERGENCY DEPT VISIT HI MDM: CPT

## 2025-05-21 ENCOUNTER — APPOINTMENT (OUTPATIENT)
Dept: CT IMAGING | Facility: HOSPITAL | Age: 40
End: 2025-05-21
Attending: EMERGENCY MEDICINE
Payer: MEDICAID

## 2025-05-21 ENCOUNTER — HOSPITAL ENCOUNTER (EMERGENCY)
Facility: HOSPITAL | Age: 40
Discharge: HOME | End: 2025-05-21
Attending: STUDENT IN AN ORGANIZED HEALTH CARE EDUCATION/TRAINING PROGRAM
Payer: MEDICAID

## 2025-05-21 VITALS
SYSTOLIC BLOOD PRESSURE: 152 MMHG | WEIGHT: 160 LBS | BODY MASS INDEX: 21.2 KG/M2 | HEART RATE: 67 BPM | DIASTOLIC BLOOD PRESSURE: 95 MMHG | OXYGEN SATURATION: 93 % | RESPIRATION RATE: 13 BRPM | TEMPERATURE: 97 F | HEIGHT: 73 IN

## 2025-05-21 DIAGNOSIS — G89.29 CHRONIC NONINTRACTABLE HEADACHE, UNSPECIFIED HEADACHE TYPE: Primary | ICD-10-CM

## 2025-05-21 DIAGNOSIS — D32.9 MENINGIOMA (HCC): ICD-10-CM

## 2025-05-21 DIAGNOSIS — R51.9 CHRONIC NONINTRACTABLE HEADACHE, UNSPECIFIED HEADACHE TYPE: Primary | ICD-10-CM

## 2025-05-21 LAB
ALBUMIN SERPL-MCNC: 4.6 G/DL (ref 3.2–4.8)
ALBUMIN/GLOB SERPL: 1 {RATIO} (ref 1–2)
ALP LIVER SERPL-CCNC: 102 U/L (ref 45–117)
ALT SERPL-CCNC: 14 U/L (ref 10–49)
ANION GAP SERPL CALC-SCNC: 13 MMOL/L (ref 0–18)
AST SERPL-CCNC: 16 U/L (ref ?–34)
BILIRUB SERPL-MCNC: 0.3 MG/DL (ref 0.3–1.2)
BUN BLD-MCNC: 34 MG/DL (ref 9–23)
CALCIUM BLD-MCNC: 9.3 MG/DL (ref 8.7–10.6)
CHLORIDE SERPL-SCNC: 99 MMOL/L (ref 98–112)
CO2 SERPL-SCNC: 28 MMOL/L (ref 21–32)
CREAT BLD-MCNC: 8.98 MG/DL (ref 0.7–1.3)
EGFRCR SERPLBLD CKD-EPI 2021: 7 ML/MIN/1.73M2 (ref 60–?)
GLOBULIN PLAS-MCNC: 4.5 G/DL (ref 2–3.5)
GLUCOSE BLD-MCNC: 115 MG/DL (ref 70–99)
OSMOLALITY SERPL CALC.SUM OF ELEC: 299 MOSM/KG (ref 275–295)
POTASSIUM SERPL-SCNC: 4.1 MMOL/L (ref 3.5–5.1)
PROT SERPL-MCNC: 9.1 G/DL (ref 5.7–8.2)
SODIUM SERPL-SCNC: 140 MMOL/L (ref 136–145)

## 2025-05-21 PROCEDURE — 96375 TX/PRO/DX INJ NEW DRUG ADDON: CPT

## 2025-05-21 PROCEDURE — 96365 THER/PROPH/DIAG IV INF INIT: CPT

## 2025-05-21 PROCEDURE — 70450 CT HEAD/BRAIN W/O DYE: CPT | Performed by: EMERGENCY MEDICINE

## 2025-05-21 PROCEDURE — 96367 TX/PROPH/DG ADDL SEQ IV INF: CPT

## 2025-05-21 RX ORDER — HYDROMORPHONE HYDROCHLORIDE 1 MG/ML
0.5 INJECTION, SOLUTION INTRAMUSCULAR; INTRAVENOUS; SUBCUTANEOUS ONCE
Refills: 0 | Status: COMPLETED | OUTPATIENT
Start: 2025-05-21 | End: 2025-05-21

## 2025-05-21 RX ORDER — METOCLOPRAMIDE HYDROCHLORIDE 5 MG/ML
10 INJECTION INTRAMUSCULAR; INTRAVENOUS ONCE
Status: COMPLETED | OUTPATIENT
Start: 2025-05-21 | End: 2025-05-21

## 2025-05-21 RX ORDER — DIPHENHYDRAMINE HYDROCHLORIDE 50 MG/ML
12.5 INJECTION, SOLUTION INTRAMUSCULAR; INTRAVENOUS ONCE
Status: COMPLETED | OUTPATIENT
Start: 2025-05-21 | End: 2025-05-21

## 2025-05-21 RX ORDER — MAGNESIUM SULFATE 1 G/100ML
1 INJECTION INTRAVENOUS ONCE
Status: COMPLETED | OUTPATIENT
Start: 2025-05-21 | End: 2025-05-21

## 2025-05-21 NOTE — ED PROVIDER NOTES
Patient Seen in: [facility] Emergency Department        History  Chief Complaint   Patient presents with    Headache    Hypertension     Stated Complaint: headache, think he is hypertensive - unable to check at home    Subjective:   HPI            Patient is a 39-year-old male history of hypertension, seizure disorder, benign meningioma CHF, FSGS ESRD on hemodialysis who presents with acute on chronic headache.  He states he has had headache intermittently over the last few days for about a month.  He denies any associated neck stiffness, photophobia, fevers chills or focal neurological deficit.  Symptoms started today while he was at the grocery store.  He states he has been taking the medications he is described are not recent admission from [facility] about did not really help.  Patient notes he has follow-up with a neurosurgeon because he was told he had a tumor in his brain which could be causing his headaches.    I reviewed care everywhere and I see a discharge summary from May 14 from [facility].  And appear patient was admitted for concerns for possible seizure along with his headache.  He did have an MRI which showed a meningioma.  Patient showed me he has a follow-up appointment with neurosurgery at [facility] next Tuesday.  This appointment was apparently moved up given his continued symptoms.    Objective:     Past Medical History:    Essential hypertension    Seizure disorder (HCC)    Stroke (HCC)              Past Surgical History:   Procedure Laterality Date    Transplantation of kidney  2017                Social History     Socioeconomic History    Marital status:    Tobacco Use    Smoking status: Never    Smokeless tobacco: Never     Social Drivers of Health     Food Insecurity: No Food Insecurity (5/10/2025)    Received from [facility]    Food Insecurity     Currently or in the past 3 months, have you worried your food would run out before you had money to buy more?: No      In the past 12 months, have you run out of food or been unable to get more?: No   Transportation Needs: No Transportation Needs (5/10/2025)    Received from [facility]    Transportation Needs     Currently or in the past 3 months, has lack of transportation kept you from medical appointments, getting food or medicine, or providing care to a family member?: No   Housing Stability: Low Risk  (1/19/2025)    Housing Stability     Housing Instability: No                                Physical Exam    ED Triage Vitals [05/20/25 2318]   /82   Pulse 69   Resp 18   Temp 97.4 °F (36.3 °C)   Temp src Temporal   SpO2 97 %   O2 Device None (Room air)       Current Vitals:   Vital Signs  BP: (!) 147/97  Pulse: 68  Resp: 23  Temp: 97.4 °F (36.3 °C)  Temp src: Temporal  MAP (mmHg): (!) 112    Oxygen Therapy  SpO2: 94 %  O2 Device: None (Room air)            Physical Exam  Vitals and nursing note reviewed.   Constitutional:       General: He is not in acute distress.     Appearance: Normal appearance.   HENT:      Head: Normocephalic.      Nose: Nose normal.      Mouth/Throat:      Mouth: Mucous membranes are moist.   Eyes:      Extraocular Movements: Extraocular movements intact.      Pupils: Pupils are equal, round, and reactive to light.   Cardiovascular:      Rate and Rhythm: Normal rate and regular rhythm.      Pulses: Normal pulses.   Pulmonary:      Effort: Pulmonary effort is normal.   Abdominal:      General: Abdomen is flat. Bowel sounds are normal. There is no distension.      Palpations: Abdomen is soft.      Tenderness: There is no abdominal tenderness. There is no right CVA tenderness, left CVA tenderness, guarding or rebound.      Hernia: No hernia is present.   Musculoskeletal:         General: No swelling or tenderness. Normal range of motion.      Cervical back: Normal range of motion.   Skin:     General: Skin is warm and dry.   Neurological:      Mental Status: He is alert and oriented  to person, place, and time. Mental status is at baseline.   Psychiatric:         Mood and Affect: Mood normal.                 ED Course  Labs Reviewed   CBC WITH DIFFERENTIAL WITH PLATELET - Abnormal; Notable for the following components:       Result Value    RBC 4.14 (*)     HGB 10.7 (*)     HCT 33.0 (*)     MCV 79.7 (*)     MCH 25.8 (*)     Lymphocyte Absolute 0.63 (*)     All other components within normal limits   COMP METABOLIC PANEL (14) - Abnormal; Notable for the following components:    Glucose 115 (*)     BUN 34 (*)     Creatinine 8.98 (*)     Calculated Osmolality 299 (*)     eGFR-Cr 7 (*)     Total Protein 9.1 (*)     Globulin  4.5 (*)     All other components within normal limits   RAINBOW DRAW LAVENDER   RAINBOW DRAW LIGHT GREEN   RAINBOW DRAW BLUE     EKG    Rate, intervals and axes as noted on EKG Report.  Rate: 69  Rhythm: Sinus Rhythm  Reading: When compared to EKG from January 18 2025 no significant change from              CT head      IMPRESSION:    No priors.    There is chronic encephalomalacia in the left parietal and temporal lobes, with ex vacuo dilation of the trigone of the left lateral ventricle.  Otherwise normal ventricular system and basal cisterns.    There is a smoothly marginated hyperdense structure in the right occipital region measuring 16 mm in diameter.  It is unclear if this is intra or extra axial.  No associated edema.  Further evaluation with MRI recommended.    No acute intracranial hemorrhage, midline shift, or mass effect.  Unremarkable bone structures.    Report sent at 02:34 AM ET    Vlad Hackett MD  This report has been electronically signed and verified by the Radiologist whose name is printed above.           MRI from 5/13/25 at [facility]  Impression      1. No significant interval change since the previous imaging studies. No acute abnormality such as infarct, hemorrhage, or hydrocephalus.    2. Left temporoparietal encephalomalacia with  gliosis. Right parietal convexity meningioma.      Attending: Rinku Gardner on 05/13/2025 7:48 AM  Narrative    EXAM:  MRI BRAIN WITHOUT IV CONTRAST, 5/13/2025 7:36 am    HISTORY:  Headache, increasing frequency or severity.    COMPARISON:  CT head 05/10/2025. MRI head 09/26/2024.    TECHNIQUE:  Noncontrast sagittal and axial T1 weighted, axial and fat-suppressed coronal T2 weighted, axial FLAIR, axial susceptibility weighted, and axial diffusion weighted images with ADC maps were obtained.    FINDINGS:  In comparison to the previous imaging studies, no significant interval change is appreciated. No acute infarct, hemorrhage, mass effect, hydrocephalus, or extra-axial abnormal fluid collection is identified.    Again noted is the large area of encephalomalacia with surrounding gliosis in the left parietal and posterior temporal regions. There is associated ex vacuo dilatation of the left lateral ventricle.    Right parietal convexity dural-based lesion remains stable measuring about 1.7 x 1.2 cm in the axial plane, and favoring a meningioma.    Normal major vessel flow voids are seen intracranially. Apart from the left maxillary sinus small retention cysts, the paranasal sinuses and mastoid air cells appear normally aerated. A partially empty sella is visible.     MDM             Medical Decision Making  The differential includes the following  Migraine/tension headache, intracranial hemorrhage which is life-threatening,    Pertinent comorbidities include  As detailed above    Pertinent social history includes  As detailed above    Labs  Potassium is 4.1, BUN 34, creatinine 8.98, bicarb 28  Hemoglobin 10.7    Imaging studies  I reviewed the images and my independent interpreation after review is lesion right lower aspect but no intracranial hemorrhage and there is evidence of encephalomalacia/volume loss. Additionaly, I reviewd the radiology report that states the following as detailed above    External data  reviewed  [facility] discharge summary from last week along with MRI results    Discussion of management with external providers    ER course  Patient is afebrile here hemodynamically stable neurologically intact.  His blood pressures 140-150 systolic and 80-90 diastolic.  His presentation is not concerning for acute CVA or TIA nor does he have any other focal neurological deficits suggestive of severe intracranial pathology that is new.  Nevertheless CT scan was obtained which shows findings consistent with meningioma that he already is known to have and encephalomalacia which is also not new.  Patient was given Reglan Benadryl magnesium Depakote followed by Dilaudid for pain control.  He has an appointment with neurosurgery which I believe he should follow with.  At this time I do not feel he needs admission.  He also has follow-up with general neurology.  Have explained all of his results to him blood work labs and imaging studies and he feels comfortable with the plan for discharge.    Disposition and Plan     Clinical Impression:  No diagnosis found.     Disposition:  There is no disposition on file for this visit.  There is no disposition time on file for this visit.    Follow-up:  No follow-up provider specified.        Medications Prescribed:  Current Discharge Medication List                Supplementary Documentation:

## 2025-05-21 NOTE — ED INITIAL ASSESSMENT (HPI)
Pt ambulatory to ED c/o severe headache intermittently for 1 week, recently discharged from [facility] last Tuesday for seizures/headaches/hypertension, pt dialysis pt - received dialysis yesterday - goes Monday/Wednesday/Friday, took BP meds directed

## 2025-05-23 LAB
ATRIAL RATE: 69 BPM
P AXIS: 74 DEGREES
P-R INTERVAL: 200 MS
Q-T INTERVAL: 448 MS
QRS DURATION: 90 MS
QTC CALCULATION (BEZET): 480 MS
R AXIS: -20 DEGREES
T AXIS: 262 DEGREES
VENTRICULAR RATE: 69 BPM

## 2025-05-26 ENCOUNTER — HOSPITAL ENCOUNTER (EMERGENCY)
Facility: HOSPITAL | Age: 40
Discharge: HOME | End: 2025-05-26
Attending: STUDENT IN AN ORGANIZED HEALTH CARE EDUCATION/TRAINING PROGRAM
Payer: MEDICAID

## 2025-05-26 VITALS
HEIGHT: 73 IN | DIASTOLIC BLOOD PRESSURE: 107 MMHG | WEIGHT: 162 LBS | TEMPERATURE: 98 F | BODY MASS INDEX: 21.47 KG/M2 | OXYGEN SATURATION: 100 % | HEART RATE: 80 BPM | RESPIRATION RATE: 13 BRPM | SYSTOLIC BLOOD PRESSURE: 200 MMHG

## 2025-05-26 DIAGNOSIS — N18.6 ESRD (END STAGE RENAL DISEASE) (HCC): ICD-10-CM

## 2025-05-26 DIAGNOSIS — R51.9 ACUTE NONINTRACTABLE HEADACHE, UNSPECIFIED HEADACHE TYPE: Primary | ICD-10-CM

## 2025-05-26 LAB
ALBUMIN SERPL-MCNC: 4.7 G/DL (ref 3.2–4.8)
ALBUMIN/GLOB SERPL: 1.2 {RATIO} (ref 1–2)
ALP LIVER SERPL-CCNC: 109 U/L (ref 45–117)
ALT SERPL-CCNC: 11 U/L (ref 10–49)
ANION GAP SERPL CALC-SCNC: 15 MMOL/L (ref 0–18)
AST SERPL-CCNC: 21 U/L (ref ?–34)
BASOPHILS # BLD AUTO: 0.01 X10(3) UL (ref 0–0.2)
BASOPHILS NFR BLD AUTO: 0.1 %
BILIRUB SERPL-MCNC: <0.2 MG/DL (ref 0.3–1.2)
BUN BLD-MCNC: 65 MG/DL (ref 9–23)
BUN/CREAT SERPL: 7 (ref 10–20)
CALCIUM BLD-MCNC: 9.3 MG/DL (ref 8.7–10.4)
CHLORIDE SERPL-SCNC: 99 MMOL/L (ref 98–112)
CHOLEST SERPL-MCNC: 148 MG/DL (ref ?–200)
CO2 SERPL-SCNC: 25 MMOL/L (ref 21–32)
CREAT BLD-MCNC: 9.23 MG/DL (ref 0.7–1.3)
DEPRECATED RDW RBC AUTO: 51.5 FL (ref 35.1–46.3)
EGFRCR SERPLBLD CKD-EPI 2021: 7 ML/MIN/1.73M2 (ref 60–?)
EOSINOPHIL # BLD AUTO: 0.15 X10(3) UL (ref 0–0.7)
EOSINOPHIL NFR BLD AUTO: 1.9 %
ERYTHROCYTE [DISTWIDTH] IN BLOOD BY AUTOMATED COUNT: 17.6 % (ref 11–15)
GLOBULIN PLAS-MCNC: 4 G/DL (ref 2–3.5)
GLUCOSE BLD-MCNC: 89 MG/DL (ref 70–99)
HCT VFR BLD AUTO: 33.6 % (ref 39–53)
HDLC SERPL-MCNC: 38 MG/DL (ref 40–59)
HGB BLD-MCNC: 10.6 G/DL (ref 13–17.5)
IMM GRANULOCYTES # BLD AUTO: 0.03 X10(3) UL (ref 0–1)
IMM GRANULOCYTES NFR BLD: 0.4 %
LDLC SERPL CALC-MCNC: 89 MG/DL (ref ?–100)
LYMPHOCYTES # BLD AUTO: 0.77 X10(3) UL (ref 1–4)
LYMPHOCYTES NFR BLD AUTO: 9.6 %
MCH RBC QN AUTO: 25.8 PG (ref 26–34)
MCHC RBC AUTO-ENTMCNC: 31.5 G/DL (ref 31–37)
MCV RBC AUTO: 81.8 FL (ref 80–100)
MONOCYTES # BLD AUTO: 1.36 X10(3) UL (ref 0.1–1)
MONOCYTES NFR BLD AUTO: 17 %
NEUTROPHILS # BLD AUTO: 5.66 X10 (3) UL (ref 1.5–7.7)
NEUTROPHILS # BLD AUTO: 5.66 X10(3) UL (ref 1.5–7.7)
NEUTROPHILS NFR BLD AUTO: 71 %
NONHDLC SERPL-MCNC: 110 MG/DL (ref ?–130)
OSMOLALITY SERPL CALC.SUM OF ELEC: 306 MOSM/KG (ref 275–295)
PLATELET # BLD AUTO: 182 10(3)UL (ref 150–450)
PLATELETS.RETICULATED NFR BLD AUTO: 8.4 % (ref 0–7)
POTASSIUM SERPL-SCNC: 5.4 MMOL/L (ref 3.5–5.1)
PROT SERPL-MCNC: 8.7 G/DL (ref 5.7–8.2)
RBC # BLD AUTO: 4.11 X10(6)UL (ref 4.3–5.7)
SODIUM SERPL-SCNC: 139 MMOL/L (ref 136–145)
TRIGL SERPL-MCNC: 114 MG/DL (ref 30–149)
TROPONIN I SERPL HS-MCNC: 93 NG/L (ref ?–53)
VLDLC SERPL CALC-MCNC: 18 MG/DL (ref 0–30)
WBC # BLD AUTO: 8 X10(3) UL (ref 4–11)

## 2025-05-26 PROCEDURE — 93005 ELECTROCARDIOGRAM TRACING: CPT

## 2025-05-26 PROCEDURE — 99285 EMERGENCY DEPT VISIT HI MDM: CPT

## 2025-05-26 PROCEDURE — 85025 COMPLETE CBC W/AUTO DIFF WBC: CPT | Performed by: STUDENT IN AN ORGANIZED HEALTH CARE EDUCATION/TRAINING PROGRAM

## 2025-05-26 PROCEDURE — 96366 THER/PROPH/DIAG IV INF ADDON: CPT

## 2025-05-26 PROCEDURE — 96375 TX/PRO/DX INJ NEW DRUG ADDON: CPT

## 2025-05-26 PROCEDURE — 80061 LIPID PANEL: CPT | Performed by: STUDENT IN AN ORGANIZED HEALTH CARE EDUCATION/TRAINING PROGRAM

## 2025-05-26 PROCEDURE — 84484 ASSAY OF TROPONIN QUANT: CPT | Performed by: STUDENT IN AN ORGANIZED HEALTH CARE EDUCATION/TRAINING PROGRAM

## 2025-05-26 PROCEDURE — 80053 COMPREHEN METABOLIC PANEL: CPT | Performed by: STUDENT IN AN ORGANIZED HEALTH CARE EDUCATION/TRAINING PROGRAM

## 2025-05-26 PROCEDURE — 96365 THER/PROPH/DIAG IV INF INIT: CPT

## 2025-05-26 PROCEDURE — 93010 ELECTROCARDIOGRAM REPORT: CPT

## 2025-05-26 RX ORDER — HYDRALAZINE HYDROCHLORIDE 20 MG/ML
20 INJECTION INTRAMUSCULAR; INTRAVENOUS ONCE
Status: COMPLETED | OUTPATIENT
Start: 2025-05-26 | End: 2025-05-26

## 2025-05-26 RX ORDER — METOCLOPRAMIDE HYDROCHLORIDE 5 MG/ML
10 INJECTION INTRAMUSCULAR; INTRAVENOUS ONCE
Status: COMPLETED | OUTPATIENT
Start: 2025-05-26 | End: 2025-05-26

## 2025-05-26 RX ORDER — DIPHENHYDRAMINE HYDROCHLORIDE 50 MG/ML
25 INJECTION, SOLUTION INTRAMUSCULAR; INTRAVENOUS ONCE
Status: COMPLETED | OUTPATIENT
Start: 2025-05-26 | End: 2025-05-26

## 2025-05-26 RX ORDER — LABETALOL HYDROCHLORIDE 5 MG/ML
10 INJECTION, SOLUTION INTRAVENOUS ONCE
Status: COMPLETED | OUTPATIENT
Start: 2025-05-26 | End: 2025-05-26

## 2025-05-26 NOTE — DISCHARGE INSTRUCTIONS
Please proceed directly to dialysis as we discussed today, the center is open till 6 PM    Try to stay well hydrated at home. Please return to the emergency department if you develop  worsening of your headache or a new headache which is severe, associated with vision  changes, associated with neck stiffness or fever, or if it is different from any other headache that  you have had before. Return to the emergency department if you develop numbness,  weakness or tingling or problems with coordination, or if you develop severe nausea and  vomiting and are unable to keep down fluids at home.

## 2025-05-26 NOTE — ED INITIAL ASSESSMENT (HPI)
Dialysis Pt with history of Hypertension , Seizure , Stroke came in for headache that started last night.  Per pt he had last dialysis Friday.  Denies Nausea and vomiting , numbness, dizziness.  Pt is A/OX 4, breathing unlabored.

## 2025-05-26 NOTE — ED PROVIDER NOTES
Patient Seen in: [facility] Emergency Department        History  Chief Complaint   Patient presents with    Headache    Hypertension     Stated Complaint: Headache    Subjective:   HPI            38-year-old male with history of hypertension, history of type B dissection status post TEVAR, CAD, ESRD on HD secondary to FSGS status post failed renal transplant, seizure disorder, prior left MCA CVA, prior intracranial hemorrhage, presents for evaluation of a headache.  States holocranial headache, intermittent, over the past month.  Taking Tylenol over-the-counter without relief of pain.  Seen recently in the ER 5-21 and 5-24 for same, also was recently admitted at [facility] for similar.  Follows with neurologist.  Has been diagnosed with a meningioma.  He has light sensitivity denies any new numbness weakness or tingling.  He has a right chest PermCath he is last dialyzed Saturday -but is normally dialyzed Monday Wednesday Friday.  Concerned because his blood pressure was quite elevated today.  He has follow-up with neurosurgery tomorrow regarding previously diagnosed meningioma.  He has had multiple recent neuroimaging including a CT brain on 5-21, MRI brain 5-13 -he has a dural based lesion about 1.7 x 1.2 cm, that is thought to be likely meningioma.      Objective:     Past Medical History:    Dialysis patient    Essential hypertension    Seizure disorder (HCC)    Stroke (HCC)              Past Surgical History:   Procedure Laterality Date    Transplantation of kidney  2017                Social History     Socioeconomic History    Marital status:    Tobacco Use    Smoking status: Never    Smokeless tobacco: Never     Social Drivers of Health     Food Insecurity: No Food Insecurity (5/10/2025)    Received from [facility]    Food Insecurity     Currently or in the past 3 months, have you worried your food would run out before you had money to buy more?: No     In the past 12  months, have you run out of food or been unable to get more?: No   Transportation Needs: No Transportation Needs (5/10/2025)    Received from [facility]    Transportation Needs     Currently or in the past 3 months, has lack of transportation kept you from medical appointments, getting food or medicine, or providing care to a family member?: No   Housing Stability: Low Risk  (1/19/2025)    Housing Stability     Housing Instability: No                                Physical Exam    ED Triage Vitals [05/26/25 0823]   BP (!) 227/139   Pulse 83   Resp 18   Temp 97.9 °F (36.6 °C)   Temp src Oral   SpO2 100 %   O2 Device None (Room air)       Current Vitals:   Vital Signs  BP: (!) 200/107  Pulse: 80  Resp: 13  Temp: 97.9 °F (36.6 °C)  Temp src: Oral  MAP (mmHg): (!) 135    Oxygen Therapy  SpO2: 100 %  O2 Device: None (Room air)            Physical Exam  Constitutional: awake, alert, no sig distress  HENT: mmm, no lesions,  Neck: normal range of motion, no tenderness, supple.  Eyes: PERRL, EOMI, conjunctiva normal, no discharge. Sclera anicteric.  Cardiovascular: rr no murmur  +permcath right chest  Respiratory: Normal breath sounds, no respiratory distress, no wheezing, no chest tenderness.  GI: Bowel sounds normal, Soft, no tenderness, no masses, no pulsatile masses.  : No CVA tenderness.  Skin: Warm, dry, no erythema, no rash.  Musculoskeletal: Intact distal pulses, no edema, no tenderness, no cyanosis, no clubbing. Good range of motion in all major joints. No tenderness to palpation or major deformities noted. Back- No tenderness.  Neurologic: Alert & oriented x 3, normal motor function, normal sensory function, no focal deficits noted.  Psych: Calm, cooperative, nl affect            ED Course  Labs Reviewed   CBC WITH DIFFERENTIAL WITH PLATELET - Abnormal; Notable for the following components:       Result Value    RBC 4.11 (*)     HGB 10.6 (*)     HCT 33.6 (*)     MCH 25.8 (*)     RDW-SD 51.5  (*)     RDW 17.6 (*)     Immature Platelet Fraction 8.4 (*)     Lymphocyte Absolute 0.77 (*)     Monocyte Absolute 1.36 (*)     All other components within normal limits   COMP METABOLIC PANEL (14) - Abnormal; Notable for the following components:    Potassium 5.4 (*)     BUN 65 (*)     Creatinine 9.23 (*)     BUN/CREA Ratio 7.0 (*)     Calculated Osmolality 306 (*)     eGFR-Cr 7 (*)     Bilirubin, Total <0.2 (*)     Total Protein 8.7 (*)     Globulin  4.0 (*)     All other components within normal limits   TROPONIN I HIGH SENSITIVITY - Abnormal; Notable for the following components:    Troponin I (High Sensitivity) 93 (*)     All other components within normal limits   LIPID PANEL - Abnormal; Notable for the following components:    HDL Cholesterol 38 (*)     All other components within normal limits   RAINBOW DRAW LAVENDER   RAINBOW DRAW LIGHT GREEN   RAINBOW DRAW BLUE   RAINBOW DRAW GOLD     EKG    Rate, intervals and axes as noted on EKG Report.  Rate: 80  Rhythm: Sinus Rhythm  Reading: SR, left axis, LVH changes, lateral TWI similar to previous, Qtc 482    -no STEMI         ED Course as of 05/26/25 1350  ------------------------------------------------------------  Time: 05/26 0937  Value: Troponin I (High Sensitivity)(!!): 93  Comment: Noted patient has history of chronically elevated troponin likely secondary to ESRD and hypertension.  Patient has no anginal symptoms.  ------------------------------------------------------------  Time: 05/26 1315  Comment: On reevaluation patient's pain improved somewhat 6 or 7 out of 10.  In light of persistently elevated blood pressure, and persistent pain he was offered admission which he declined.  We discussed further lab abnormalities, and the need for dialysis which is scheduled for today.  I called his dialysis center, and arrange for him to have a leg chair.  He was offered the alternative of admission with dialysis as an inpatient.  He declined admission and prefers  to go home.  He also declined CT brain.  Return precautions and follow-up instructions were discussed with patient who voiced understanding and agreement the plan.  All questions were answered to patient satisfaction.                       MDM       39M hx as above, presenting with HA  On arrival vss, reassuring  -reviewed ER visits for same 5/21 and 5/24  -reviewed Hospital Admission at [facility] 5/10  -reviewed impressions from [facility] 5/21 and 5/10, Reviewed MRI Brain 5/13    Labs ordered in triage, recommended CT brain to exclude ICH given history of same which patient declined citing multiple recent CT and MRIs.  Will give migraine cocktail and reassess.       After Reglan and Benadryl patient still with significant pain, given Depacon with improvement of symptoms.  Labs show mild hyperkalemia and other changes compatible with ESRD.  Chronically elevated troponin likely secondary to ESRD and hypertension.  No other acute findings.  Medical Decision Making      Disposition and Plan     Clinical Impression:  1. Acute nonintractable headache, unspecified headache type    2. ESRD (end stage renal disease) (HCC)         Disposition:  Discharge  5/26/2025  1:15 pm    Follow-up:  Cheryl Ryder MD  [facility]  [facility] [address]  [phone]    Follow up      [facility] Emergency Department  [address] [facility] [address]  [phone]  Follow up  As needed, If symptoms worsen          Medications Prescribed:  Discharge Medication List as of 5/26/2025  1:16 PM                Supplementary Documentation:

## 2025-05-27 LAB
ATRIAL RATE: 80 BPM
P AXIS: 58 DEGREES
P-R INTERVAL: 184 MS
Q-T INTERVAL: 418 MS
QRS DURATION: 90 MS
QTC CALCULATION (BEZET): 482 MS
R AXIS: -6 DEGREES
T AXIS: 110 DEGREES
VENTRICULAR RATE: 80 BPM

## (undated) NOTE — LETTER
[address]  [address]  Consent for Procedure/Sedation  Date: 1/24/25         Time: 0800    I hereby authorize Dr ADIS Knapp, my physician and his/her assistants (if applicable), which may include medical students, residents, and/or fellows, to perform the following surgical operation/ procedure and administer such anesthesia as may be determined necessary by my physician: Permanent Dialysis Catheter Exchange or Insertion on [facility]  2.   I recognize that during the surgical operation/procedure, unforeseen conditions may necessitate additional or different procedures than those listed above.  I, therefore, further authorize and request that the above-named surgeon, assistants, or designees perform such procedures as are, in their judgment, necessary and desirable.    3.   My surgeon/physician has discussed prior to my surgery the potential benefits, risks and side effects of this procedure; the likelihood of achieving goals; and potential problems that might occur during recuperation.  They also discussed reasonable alternatives to the procedure, including risks, benefits, and side effects related to the alternatives and risks related to not receiving this procedure.  I have had all my questions answered and I acknowledge that no guarantee has been made as to the result that may be obtained.    4.   Should the need arise during my operation/procedure, which includes change of level of care prior to discharge, I also consent to the administration of blood and/or blood products.  Further, I understand that despite careful testing and screening of blood or blood products by collecting agencies, I may still be subject to ill effects as a result of receiving a blood transfusion and/or blood products.  The following are some, but not all, of the potential risks that can occur: fever and allergic reactions, hemolytic reactions, transmission of diseases such as Hepatitis, AIDS and  Cytomegalovirus (CMV) and fluid overload.  In the event that I wish to have an autologous transfusion of my own blood, or a directed donor transfusion, I will discuss this with my physician.   Check only if Refusing Blood or Blood Products  I understand refusal of blood or blood products as deemed necessary by my physician may have serious consequences to my condition to include possible death. I hereby assume responsibility for my refusal and release the hospital, its personnel, and my physicians from any responsibility for the consequences of my refusal.         o  Refuse         5.   I authorize the use of any specimen, organs, tissues, body parts or foreign objects that may be removed from my body during the operation/procedure for diagnosis, research or teaching purposes and their subsequent disposal by hospital authorities.  I also authorize the release of specimen test results and/or written reports to my treating physician on the hospital medical staff or other referring or consulting physicians involved in my care, at the discretion of the Pathologist or my treating physician.    6.   I consent to the photographing or videotaping of the operations or procedures to be performed, including appropriate portions of my body for medical, scientific, or educational purposes, provided my identity is not revealed by the pictures or by descriptive texts accompanying them.  If the procedure has been photographed/videotaped, the surgeon will obtain the original picture, image, videotape or CD.  The hospital will not be responsible for storage, release or maintenance of the picture, image, tape or CD.    7.   I consent to the presence of a  or observers in the operating room as deemed necessary by my physician or their designees.    8.   I recognize that in the event my procedure results in extended X-Ray/fluoroscopy time, I may develop a skin reaction.    9. If I have a Do Not Attempt Resuscitation  (DNAR) order in place, that status will be suspended while in the operating room, procedural suite, and during the recovery period unless otherwise explicitly stated by me (or a person authorized to consent on my behalf). The surgeon or my attending physician will determine when the applicable recovery period ends for purposes of reinstating the DNAR order.  10. Patients having a sterilization procedure: I understand that if the procedure is successful the results will be permanent and it will therefore be impossible for me to inseminate, conceive, or bear children.  I also understand that the procedure is intended to result in sterility, although the result has not been guaranteed.   11. I acknowledge that my physician has explained sedation/analgesia administration to me including the risk and benefits I consent to the administration of sedation/analgesia as may be necessary or desirable in the judgment of my physician.    I CERTIFY THAT I HAVE READ AND FULLY UNDERSTAND THE ABOVE CONSENT TO OPERATION and/or OTHER PROCEDURE.        ____________________________________       _________________________________      ______________________________  Signature of Patient         Signature of Responsible Person        Printed Name of Responsible Person        ____________________________________      _________________________________      ______________________________       Signature of Witness          Relationship to Patient                       Date                                       Time  Patient Name: Christian Hernandez  : 1985    Reviewed: 2024   Printed: 2025  Medical Record #: [id number] Page 1 of 1

## (undated) NOTE — LETTER
[facility]  Administration of Anesthesia  IChristian agree to be cared for by a physician anesthesiologist alone and/or with a nurse anesthetist, who is specially trained to monitor me and give me medicine to put me to sleep or keep me comfortable during my procedure    I understand that my anesthesiologist and/or anesthetist is not an employee or agent of [facility] or [employer]. He or she works for [facility]    As the patient asking for anesthesia services, I agree to:  Allow the anesthesiologist (anesthesia doctor) to give me medicine and do additional procedures as necessary. Some examples are: Starting or using an “IV” to give me medicine, fluids or blood during my procedure, and having a breathing tube placed to help me breathe when I’m asleep (intubation). In the event that my heart stops working properly, I understand that my anesthesiologist will make every effort to sustain my life, unless otherwise directed by [facility] Do Not Resuscitate documents.  Tell my anesthesia doctor before my procedure:  If I am pregnant.  The last time that I ate or drank.  iii. All of the medicines I take (including prescriptions, herbal supplements, and pills I can buy without a prescription (including street drugs/illegal medications). Failure to inform my anesthesiologist about these medicines may increase my risk of anesthetic complications.  iv.If I am allergic to anything or have had a reaction to anesthesia before.  I understand how the anesthesia medicine will help me (benefits).  I understand that with any type of anesthesia medicine there are risks:  The most common risks are: nausea, vomiting, sore throat, muscle soreness, damage to my eyes, mouth, or teeth (from breathing tube placement).  Rare risks include: remembering what happened during my procedure, allergic reactions to medications, injury to my airway, heart, lungs, vision, nerves, or  muscles and in extremely rare instances death.  My doctor has explained to me other choices available to me for my care (alternatives).  Pregnant Patients (“epidural”):  I understand that the risks of having an epidural (medicine given into my back to help control pain during labor), include itching, low blood pressure, difficulty urinating, headache or slowing of the baby’s heart. Very rare risks include infection, bleeding, seizure, irregular heart rhythms and nerve injury.  Regional Anesthesia (“spinal”, “epidural”, & “nerve blocks”):  I understand that rare but potential complications include headache, bleeding, infection, seizure, irregular heart rhythms, and nerve injury.    _____________________________________________________________________________  Patient (or Representative) Signature/Relationship to Patient  Date   Time    _____________________________________________________________________________   Name (if used)    Language/Organization   Time    _____________________________________________________________________________  Nurse Anesthetist Signature     Date   Time  _____________________________________________________________________________  Anesthesiologist Signature     Date   Time  I have discussed the procedure and information above with the patient (or patient’s representative) and answered their questions. The patient or their representative has agreed to have anesthesia services.    _____________________________________________________________________________  Witness        Date   Time  I have verified that the signature is that of the patient or patient’s representative, and that it was signed before the procedure  Patient Name: Christian Hernandez     : 1985                 Printed: 2025 at 8:27 PM    Medical Record #: [id number]                                            Page 1 of 1  ----------ANESTHESIA CONSENT----------

## (undated) NOTE — ED AVS SNAPSHOT
Judy Albarado   MRN: [id number]    Department:  [facility] Emergency Department   Date of Visit:  3/17/2019           Disclosure     Insurance plans vary and the physician(s) referred by the ER may not be covered by your plan.  Please contact y within the next three months to obtain basic health screening including reassessment of your blood pressure.     IF THERE IS ANY CHANGE OR WORSENING OF YOUR CONDITION, CALL YOUR PRIMARY CARE PHYSICIAN AT ONCE OR RETURN IMMEDIATELY TO THE EMERGENCY DEPARTMEN

## (undated) NOTE — LETTER
[facility]  [address], [place], [place]    Authorization for Surgical Operation and Procedure                               I hereby authorize thoracic endovascular aortic repair, possible open, possible cutdown my physician and his/her assistants (if applicable), which may include medical students, residents, and/or fellows, to perform the following surgical operation/ procedure and administer such anesthesia as may be determined necessary by my physician: Operation/Procedure name (s)  on Christian Hernandez   2.   I recognize that during the surgical operation/procedure, unforeseen conditions may necessitate additional or different procedures than those listed above.  I, therefore, further authorize and request that the above-named surgeon, assistants, or designees perform such procedures as are, in their judgment, necessary and desirable.    3.   My surgeon/physician has discussed prior to my surgery the potential benefits, risks and side effects of this procedure; the likelihood of achieving goals; and potential problems that might occur during recuperation.  They also discussed reasonable alternatives to the procedure, including risks, benefits, and side effects related to the alternatives and risks related to not receiving this procedure.  I have had all my questions answered and I acknowledge that no guarantee has been made as to the result that may be obtained.    4.   Should the need arise during my operation/procedure, which includes change of level of care prior to discharge, I also consent to the administration of blood and/or blood products.  Further, I understand that despite careful testing and screening of blood or blood products by collecting agencies, I may still be subject to ill effects as a result of receiving a blood transfusion and/or blood products.  The following are some, but not all, of the potential risks that can occur: fever and allergic reactions, hemolytic reactions,  transmission of diseases such as Hepatitis, AIDS and Cytomegalovirus (CMV) and fluid overload.  In the event that I wish to have an autologous transfusion of my own blood, or a directed donor transfusion, I will discuss this with my physician.  Check only if Refusing Blood or Blood Products  I understand refusal of blood or blood products as deemed necessary by my physician may have serious consequences to my condition to include possible death. I hereby assume responsibility for my refusal and release the hospital, its personnel, and my physicians from any responsibility for the consequences of my refusal.    o  Refuse   5.   I authorize the use of any specimen, organs, tissues, body parts or foreign objects that may be removed from my body during the operation/procedure for diagnosis, research or teaching purposes and their subsequent disposal by hospital authorities.  I also authorize the release of specimen test results and/or written reports to my treating physician on the hospital medical staff or other referring or consulting physicians involved in my care, at the discretion of the Pathologist or my treating physician.    6.   I consent to the photographing or videotaping of the operations or procedures to be performed, including appropriate portions of my body for medical, scientific, or educational purposes, provided my identity is not revealed by the pictures or by descriptive texts accompanying them.  If the procedure has been photographed/videotaped, the surgeon will obtain the original picture, image, videotape or CD.  The hospital will not be responsible for storage, release or maintenance of the picture, image, tape or CD.    7.   I consent to the presence of a  or observers in the operating room as deemed necessary by my physician or their designees.    8.   I recognize that in the event my procedure results in extended X-Ray/fluoroscopy time, I may develop a skin reaction.    9. If  I have a Do Not Attempt Resuscitation (DNAR) order in place, that status will be suspended while in the operating room, procedural suite, and during the recovery period unless otherwise explicitly stated by me (or a person authorized to consent on my behalf). The surgeon or my attending physician will determine when the applicable recovery period ends for purposes of reinstating the DNAR order.  10. Patients having a sterilization procedure: I understand that if the procedure is successful the results will be permanent and it will therefore be impossible for me to inseminate, conceive, or bear children.  I also understand that the procedure is intended to result in sterility, although the result has not been guaranteed.   11. I acknowledge that my physician has explained sedation/analgesia administration to me including the risk and benefits I consent to the administration of sedation/analgesia as may be necessary or desirable in the judgment of my physician.    I CERTIFY THAT I HAVE READ AND FULLY UNDERSTAND THE ABOVE CONSENT TO OPERATION and/or OTHER PROCEDURE.     ____________________________________  _________________________________        ______________________________  Signature of Patient    Signature of Responsible Person                Printed Name of Responsible Person                                      ____________________________________  _____________________________                ________________________________  Signature of Witness        Date  Time         Relationship to Patient    STATEMENT OF PHYSICIAN My signature below affirms that prior to the time of the procedure; I have explained to the patient and/or his/her legal representative, the risks and benefits involved in the proposed treatment and any reasonable alternative to the proposed treatment. I have also explained the risks and benefits involved in refusal of the proposed treatment and alternatives to the proposed treatment and have  answered the patient's questions. If I have a significant financial interest in a co-management agreement or a significant financial interest in any product or implant, or other significant relationship used in this procedure/surgery, I have disclosed this and had a discussion with my patient.     _____________________________________________________              _____________________________  (Signature of Physician)                                                                                         (Date)                                   (Time)  Patient Name: Christian Hernandez      : 1985      Printed: 2025     Medical Record #: [id number]                                      Page 1 of 1

## (undated) NOTE — LETTER
[facility]  [address], [place], [place]    Authorization for Surgical Operation and Procedure                               I hereby authorize thoracic endovascular aortic repair, possible open, possible cutdown , my physician and his/her assistants (if applicable), which may include medical students, residents, and/or fellows, to perform the following surgical operation/ procedure and administer such anesthesia as may be determined necessary by my physician: Operation/Procedure name (s)  on Christian Hernandez   2.   I recognize that during the surgical operation/procedure, unforeseen conditions may necessitate additional or different procedures than those listed above.  I, therefore, further authorize and request that the above-named surgeon, assistants, or designees perform such procedures as are, in their judgment, necessary and desirable.    3.   My surgeon/physician has discussed prior to my surgery the potential benefits, risks and side effects of this procedure; the likelihood of achieving goals; and potential problems that might occur during recuperation.  They also discussed reasonable alternatives to the procedure, including risks, benefits, and side effects related to the alternatives and risks related to not receiving this procedure.  I have had all my questions answered and I acknowledge that no guarantee has been made as to the result that may be obtained.    4.   Should the need arise during my operation/procedure, which includes change of level of care prior to discharge, I also consent to the administration of blood and/or blood products.  Further, I understand that despite careful testing and screening of blood or blood products by collecting agencies, I may still be subject to ill effects as a result of receiving a blood transfusion and/or blood products.  The following are some, but not all, of the potential risks that can occur: fever and allergic reactions, hemolytic  reactions, transmission of diseases such as Hepatitis, AIDS and Cytomegalovirus (CMV) and fluid overload.  In the event that I wish to have an autologous transfusion of my own blood, or a directed donor transfusion, I will discuss this with my physician.  Check only if Refusing Blood or Blood Products  I understand refusal of blood or blood products as deemed necessary by my physician may have serious consequences to my condition to include possible death. I hereby assume responsibility for my refusal and release the hospital, its personnel, and my physicians from any responsibility for the consequences of my refusal.    o  Refuse   5.   I authorize the use of any specimen, organs, tissues, body parts or foreign objects that may be removed from my body during the operation/procedure for diagnosis, research or teaching purposes and their subsequent disposal by hospital authorities.  I also authorize the release of specimen test results and/or written reports to my treating physician on the hospital medical staff or other referring or consulting physicians involved in my care, at the discretion of the Pathologist or my treating physician.    6.   I consent to the photographing or videotaping of the operations or procedures to be performed, including appropriate portions of my body for medical, scientific, or educational purposes, provided my identity is not revealed by the pictures or by descriptive texts accompanying them.  If the procedure has been photographed/videotaped, the surgeon will obtain the original picture, image, videotape or CD.  The hospital will not be responsible for storage, release or maintenance of the picture, image, tape or CD.    7.   I consent to the presence of a  or observers in the operating room as deemed necessary by my physician or their designees.    8.   I recognize that in the event my procedure results in extended X-Ray/fluoroscopy time, I may develop a skin  reaction.    9. If I have a Do Not Attempt Resuscitation (DNAR) order in place, that status will be suspended while in the operating room, procedural suite, and during the recovery period unless otherwise explicitly stated by me (or a person authorized to consent on my behalf). The surgeon or my attending physician will determine when the applicable recovery period ends for purposes of reinstating the DNAR order.  10. Patients having a sterilization procedure: I understand that if the procedure is successful the results will be permanent and it will therefore be impossible for me to inseminate, conceive, or bear children.  I also understand that the procedure is intended to result in sterility, although the result has not been guaranteed.   11. I acknowledge that my physician has explained sedation/analgesia administration to me including the risk and benefits I consent to the administration of sedation/analgesia as may be necessary or desirable in the judgment of my physician.    I CERTIFY THAT I HAVE READ AND FULLY UNDERSTAND THE ABOVE CONSENT TO OPERATION and/or OTHER PROCEDURE.     ____________________________________  _________________________________        ______________________________  Signature of Patient    Signature of Responsible Person                Printed Name of Responsible Person                                      ____________________________________  _____________________________                ________________________________  Signature of Witness        Date  Time         Relationship to Patient    STATEMENT OF PHYSICIAN My signature below affirms that prior to the time of the procedure; I have explained to the patient and/or his/her legal representative, the risks and benefits involved in the proposed treatment and any reasonable alternative to the proposed treatment. I have also explained the risks and benefits involved in refusal of the proposed treatment and alternatives to the proposed  treatment and have answered the patient's questions. If I have a significant financial interest in a co-management agreement or a significant financial interest in any product or implant, or other significant relationship used in this procedure/surgery, I have disclosed this and had a discussion with my patient.     _____________________________________________________              _____________________________  (Signature of Physician)                                                                                         (Date)                                   (Time)  Patient Name: Christian Hernandez      : 1985      Printed: 2025     Medical Record #: [id number]                                      Page 1 of 1

## (undated) NOTE — LETTER
[facility]  [address] [facility] [address] [facility] [address]  [phone]    Blood Transfusion Consent    In the course of your treatment, it may become necessary to administer a transfusion of blood or blood components. This form provides basic information concerning this procedure and, if signed by you, authorizes its administration. By signing this form, you agree that all of your questions about the administration of blood or blood products have been answered by the ordering medical professional or designee.    Description of Procedure  Blood is introduced into one of your veins, commonly in the arm, using a sterilized disposable needle. The amount of blood transfused, and whether the transfusion will be of blood or blood components is a judgement the physician will make based on your particular needs.    Risks  The transfusion is a common procedure of low risk.  MINOR AND TEMPORARY REACTIONS ARE NOT UNCOMMON, including a slight bruise, swelling or local reaction in the area where the needle pierces your skin, or a nonserious reaction to the transfused material itself, including headache, fever or mild skin reaction, such as rash.  Serious reactions are possible, though very unlikely, and include severe allergic reaction (shock) and destruction (hemolysis) of transfused blood cells.  Infectious diseases which are known to be transmitted by blood transfusion include certain types of viral Hepatitis(liver infection from a virus), Human Immunodeficiency Virus (HIV-1,2) infection, a viral infection known to cause Acquired Immunodeficiency Syndrome (AIDS), as well as certain other bacterial, viral, and parasitic diseases. While a minimal risk of acquiring an infectious disease from transfused blood exists, in accordance with the Federal and State law, all due care has been taken in donor selection and testing to avoid transmission of disease.    Alternatives  If loss of blood poses serious threats during your  treatment, THERE IS NO EFFECTIVE ALTERNATIVE TO BLOOD TRANSFUSION. However, if you have any further questions on this matter, your provider will fully explain the alternatives to you if it has not already been done.    I, ______________________________, have read/had read to me the above. I understand the matters bearing on the decision whether or not to authorize a transfusion of blood or blood components. I have no questions which have not been answered to my full satisfaction. I hereby consent to such transfusion as my physician may deem necessary or advisable in the course of my treatment.    ______________________________________________                    ___________________________  (Signature of Patient or Responsible party in case of minor,                 (Printed Name of Patient or incompetent, or unconscious patient)              Responsible Party)    ___________________________               _____________________  (Relationship to Patient if not self)                                    (Date and Time)    __________________________                                                           ______________________              (Signature of Witness)               (Printed Name of Witness)     Language line ()    Telephone/Verbal/Video Consent    __________________________                     ____________________  (Signature of 2nd Witness           (Printed Name of 2nd  Telephone/Verbal/Video Consent)           Witness)    Patient Name: Christian Hernandez     : 1985                 Printed: 2025     Medical Record #: [id number]      Rev: 2023